# Patient Record
Sex: FEMALE | Race: WHITE | NOT HISPANIC OR LATINO | Employment: UNEMPLOYED | URBAN - NONMETROPOLITAN AREA
[De-identification: names, ages, dates, MRNs, and addresses within clinical notes are randomized per-mention and may not be internally consistent; named-entity substitution may affect disease eponyms.]

---

## 2017-10-01 ENCOUNTER — HOSPITAL ENCOUNTER (EMERGENCY)
Facility: HOSPITAL | Age: 32
Discharge: HOME | End: 2017-10-02
Attending: EMERGENCY MEDICINE | Admitting: EMERGENCY MEDICINE

## 2017-10-01 VITALS
OXYGEN SATURATION: 100 % | SYSTOLIC BLOOD PRESSURE: 134 MMHG | HEART RATE: 73 BPM | RESPIRATION RATE: 18 BRPM | BODY MASS INDEX: 31.32 KG/M2 | TEMPERATURE: 98.3 F | DIASTOLIC BLOOD PRESSURE: 79 MMHG | WEIGHT: 188 LBS | HEIGHT: 65 IN

## 2017-10-01 DIAGNOSIS — R55 SYNCOPE AND COLLAPSE: ICD-10-CM

## 2017-10-01 DIAGNOSIS — G44.52 NEW DAILY PERSISTENT HEADACHE: Primary | ICD-10-CM

## 2017-10-01 LAB
BASOPHILS # BLD AUTO: 0.05 10*3/MM3 (ref 0–0.2)
BASOPHILS NFR BLD AUTO: 0.4 % (ref 0–2)
BILIRUB UR QL STRIP: NEGATIVE
CLARITY UR: CLEAR
COLOR UR: YELLOW
DEPRECATED RDW RBC AUTO: 40.4 FL (ref 36.4–46.3)
EOSINOPHIL # BLD AUTO: 0.09 10*3/MM3 (ref 0–0.7)
EOSINOPHIL NFR BLD AUTO: 0.8 % (ref 0–7)
ERYTHROCYTE [DISTWIDTH] IN BLOOD BY AUTOMATED COUNT: 13.4 % (ref 11.5–14.5)
GLUCOSE UR STRIP-MCNC: NEGATIVE MG/DL
HCT VFR BLD AUTO: 41.3 % (ref 35–45)
HGB BLD-MCNC: 13.7 G/DL (ref 12–15.5)
HGB UR QL STRIP.AUTO: NEGATIVE
IMM GRANULOCYTES # BLD: 0.04 10*3/MM3 (ref 0–0.02)
IMM GRANULOCYTES NFR BLD: 0.3 % (ref 0–0.5)
KETONES UR QL STRIP: NEGATIVE
LEUKOCYTE ESTERASE UR QL STRIP.AUTO: NEGATIVE
LYMPHOCYTES # BLD AUTO: 3.49 10*3/MM3 (ref 0.6–4.2)
LYMPHOCYTES NFR BLD AUTO: 29.9 % (ref 10–50)
MCH RBC QN AUTO: 27.8 PG (ref 26.5–34)
MCHC RBC AUTO-ENTMCNC: 33.2 G/DL (ref 31.4–36)
MCV RBC AUTO: 83.8 FL (ref 80–98)
MONOCYTES # BLD AUTO: 0.85 10*3/MM3 (ref 0–0.9)
MONOCYTES NFR BLD AUTO: 7.3 % (ref 0–12)
NEUTROPHILS # BLD AUTO: 7.14 10*3/MM3 (ref 2–8.6)
NEUTROPHILS NFR BLD AUTO: 61.3 % (ref 37–80)
NITRITE UR QL STRIP: NEGATIVE
PH UR STRIP.AUTO: 7.5 [PH] (ref 5–9)
PLATELET # BLD AUTO: 245 10*3/MM3 (ref 150–450)
PMV BLD AUTO: 9.2 FL (ref 8–12)
PROT UR QL STRIP: NEGATIVE
RBC # BLD AUTO: 4.93 10*6/MM3 (ref 3.77–5.16)
SP GR UR STRIP: 1.01 (ref 1–1.03)
UROBILINOGEN UR QL STRIP: NORMAL
WBC NRBC COR # BLD: 11.66 10*3/MM3 (ref 3.2–9.8)

## 2017-10-01 PROCEDURE — 84703 CHORIONIC GONADOTROPIN ASSAY: CPT | Performed by: EMERGENCY MEDICINE

## 2017-10-01 PROCEDURE — 85025 COMPLETE CBC W/AUTO DIFF WBC: CPT | Performed by: EMERGENCY MEDICINE

## 2017-10-01 PROCEDURE — 96361 HYDRATE IV INFUSION ADD-ON: CPT

## 2017-10-01 PROCEDURE — 93010 ELECTROCARDIOGRAM REPORT: CPT | Performed by: INTERNAL MEDICINE

## 2017-10-01 PROCEDURE — 93005 ELECTROCARDIOGRAM TRACING: CPT | Performed by: EMERGENCY MEDICINE

## 2017-10-01 PROCEDURE — 81003 URINALYSIS AUTO W/O SCOPE: CPT | Performed by: EMERGENCY MEDICINE

## 2017-10-01 PROCEDURE — 25010000002 KETOROLAC TROMETHAMINE PER 15 MG: Performed by: EMERGENCY MEDICINE

## 2017-10-01 PROCEDURE — 99283 EMERGENCY DEPT VISIT LOW MDM: CPT

## 2017-10-01 PROCEDURE — 80048 BASIC METABOLIC PNL TOTAL CA: CPT | Performed by: EMERGENCY MEDICINE

## 2017-10-01 PROCEDURE — 96375 TX/PRO/DX INJ NEW DRUG ADDON: CPT

## 2017-10-01 PROCEDURE — 25010000002 PROMETHAZINE PER 50 MG: Performed by: EMERGENCY MEDICINE

## 2017-10-01 PROCEDURE — 96374 THER/PROPH/DIAG INJ IV PUSH: CPT

## 2017-10-01 RX ORDER — PROMETHAZINE HYDROCHLORIDE 25 MG/ML
12.5 INJECTION, SOLUTION INTRAMUSCULAR; INTRAVENOUS ONCE
Status: COMPLETED | OUTPATIENT
Start: 2017-10-01 | End: 2017-10-01

## 2017-10-01 RX ORDER — SODIUM CHLORIDE 0.9 % (FLUSH) 0.9 %
10 SYRINGE (ML) INJECTION AS NEEDED
Status: DISCONTINUED | OUTPATIENT
Start: 2017-10-01 | End: 2017-10-02 | Stop reason: HOSPADM

## 2017-10-01 RX ORDER — KETOROLAC TROMETHAMINE 30 MG/ML
30 INJECTION, SOLUTION INTRAMUSCULAR; INTRAVENOUS ONCE
Status: COMPLETED | OUTPATIENT
Start: 2017-10-01 | End: 2017-10-01

## 2017-10-01 RX ORDER — NAPROXEN 250 MG/1
250 TABLET ORAL 2 TIMES DAILY PRN
COMMUNITY
End: 2020-05-19

## 2017-10-01 RX ADMIN — SODIUM CHLORIDE 1000 ML: 9 INJECTION, SOLUTION INTRAVENOUS at 23:29

## 2017-10-01 RX ADMIN — PROMETHAZINE HYDROCHLORIDE 12.5 MG: 25 INJECTION INTRAMUSCULAR; INTRAVENOUS at 23:28

## 2017-10-01 RX ADMIN — KETOROLAC TROMETHAMINE 30 MG: 30 INJECTION, SOLUTION INTRAMUSCULAR; INTRAVENOUS at 23:29

## 2017-10-02 ENCOUNTER — APPOINTMENT (OUTPATIENT)
Dept: CT IMAGING | Facility: HOSPITAL | Age: 32
End: 2017-10-02

## 2017-10-02 LAB
ANION GAP SERPL CALCULATED.3IONS-SCNC: 10 MMOL/L (ref 5–15)
BUN BLD-MCNC: 12 MG/DL (ref 7–21)
BUN/CREAT SERPL: 14.6 (ref 7–25)
CALCIUM SPEC-SCNC: 9.4 MG/DL (ref 8.4–10.2)
CHLORIDE SERPL-SCNC: 97 MMOL/L (ref 95–110)
CO2 SERPL-SCNC: 31 MMOL/L (ref 22–31)
CREAT BLD-MCNC: 0.82 MG/DL (ref 0.5–1)
GFR SERPL CREATININE-BSD FRML MDRD: 80 ML/MIN/1.73 (ref 60–149)
GLUCOSE BLD-MCNC: 105 MG/DL (ref 60–100)
HCG SERPL QL: NEGATIVE
HOLD SPECIMEN: NORMAL
POTASSIUM BLD-SCNC: 3.7 MMOL/L (ref 3.5–5.1)
SODIUM BLD-SCNC: 138 MMOL/L (ref 137–145)
WHOLE BLOOD HOLD SPECIMEN: NORMAL

## 2017-10-02 PROCEDURE — 70450 CT HEAD/BRAIN W/O DYE: CPT

## 2017-10-02 RX ORDER — PROMETHAZINE HYDROCHLORIDE 25 MG/1
25 TABLET ORAL EVERY 6 HOURS PRN
Qty: 15 TABLET | Refills: 0 | Status: SHIPPED | OUTPATIENT
Start: 2017-10-02

## 2017-10-02 NOTE — ED TRIAGE NOTES
Patient to ED from home c/o chronic headaches. Patient notes that she was dx with migraines approximately 10 years ago. Patient notes generalized headaches as sharp and stabbing, 6/10 pain. Patient states that she passed out yesterday.

## 2017-10-02 NOTE — DISCHARGE INSTRUCTIONS
Return ED worsened headache, fever, vomiting, chest pain, shortness of air, syncope, palpitations, worse condition, other concerns  *Recommend outpatient MRI brain*

## 2017-10-02 NOTE — ED PROVIDER NOTES
Subjective   HPI Comments: 33yo female pmh significant migraine ha presents ED c/o 1 month hx daily intermittent bilateral parietal occipital 'throbbing' headache/nonradiating/neg exac or relieve factors/assoc nausea, unresponsive to acetaminophen.  Pt reports sustained syncopal episode.  Pt currently rates headache 5-6/10; denies fever/chills/trauma/paresthesia/motor weakness/worst headache of life/hx seizure/incontinence/tongue biting/fam hx aneurysm.    Patient is a 32 y.o. female presenting with general illness.   Illness   Severity:  Moderate  Onset quality:  Gradual  Duration:  1 month  Timing:  Intermittent  Progression:  Waxing and waning  Chronicity:  New  Associated symptoms: headaches, nausea and vomiting        Review of Systems   Constitutional: Negative.    Eyes: Negative.    Respiratory: Negative.    Cardiovascular: Negative.    Gastrointestinal: Positive for nausea and vomiting.   Endocrine: Negative.    Genitourinary: Negative.    Musculoskeletal: Negative.    Neurological: Positive for syncope and headaches. Negative for dizziness, seizures, facial asymmetry, weakness and numbness.       Past Medical History:   Diagnosis Date   • GERD (gastroesophageal reflux disease)    • Migraine        No Known Allergies    Past Surgical History:   Procedure Laterality Date   •  SECTION     • CHOLECYSTECTOMY     • HERNIA REPAIR     • HYSTERECTOMY         History reviewed. No pertinent family history.    Social History     Social History   • Marital status: Single     Spouse name: N/A   • Number of children: N/A   • Years of education: N/A     Social History Main Topics   • Smoking status: Never Smoker   • Smokeless tobacco: Current User     Types: Snuff   • Alcohol use No   • Drug use: None   • Sexual activity: Not Asked     Other Topics Concern   • None     Social History Narrative   • None           Objective   Physical Exam   Constitutional: She is oriented to person, place, and time. She appears  well-developed and well-nourished.   HENT:   Head: Normocephalic and atraumatic.   Right Ear: External ear normal.   Left Ear: External ear normal.   Nose: Nose normal.   Mouth/Throat: Oropharynx is clear and moist.   Eyes: EOM are normal. Pupils are equal, round, and reactive to light.   Neck: Normal range of motion. Neck supple. No JVD present. No tracheal deviation present. No Brudzinski's sign noted.   Cardiovascular: Normal rate, regular rhythm, normal heart sounds and intact distal pulses.  Exam reveals no gallop and no friction rub.    No murmur heard.  Pulmonary/Chest: Effort normal and breath sounds normal. She has no wheezes. She has no rales.   Abdominal: Soft. Bowel sounds are normal. There is no tenderness. There is no rebound and no guarding.   Musculoskeletal: Normal range of motion. She exhibits no edema.   Lymphadenopathy:     She has no cervical adenopathy.   Neurological: She is alert and oriented to person, place, and time. She has normal strength. No cranial nerve deficit or sensory deficit. Coordination normal. GCS eye subscore is 4. GCS verbal subscore is 5. GCS motor subscore is 6.   Skin: Skin is warm and dry.   Nursing note and vitals reviewed.      ECG 12 Lead    Date/Time: 10/2/2017 1:33 AM  Performed by: GREGG CORONA  Authorized by: GREGG CORONA   Rhythm: sinus bradycardia  Rate: bradycardic  BPM: 56  QRS axis: normal  Conduction: conduction normal  ST Segments: ST segments normal  T Waves: T waves normal  Other: no other findings  Clinical impression: normal ECG               ED Course  ED Course      Labs Reviewed   BASIC METABOLIC PANEL - Abnormal; Notable for the following:        Result Value    Glucose 105 (*)     All other components within normal limits   CBC WITH AUTO DIFFERENTIAL - Abnormal; Notable for the following:     WBC 11.66 (*)     Immature Grans, Absolute 0.04 (*)     All other components within normal limits   URINALYSIS W/ CULTURE IF INDICATED - Normal     Narrative:     Urine microscopic not indicated.   HCG, SERUM, QUALITATIVE - Normal   POCT PEFORM URINE PREGNANCY   CBC AND DIFFERENTIAL    Narrative:     The following orders were created for panel order CBC & Differential.  Procedure                               Abnormality         Status                     ---------                               -----------         ------                     CBC Auto Differential[[id number]]        Abnormal            Final result                 Please view results for these tests on the individual orders.   EXTRA TUBES    Narrative:     The following orders were created for panel order Extra Tubes.  Procedure                               Abnormality         Status                     ---------                               -----------         ------                     Light Blue Top[[id number]]                                   Final result               Gold Top - SST[[id number]]                                   Final result                 Please view results for these tests on the individual orders.   LIGHT BLUE TOP   GOLD TOP - SST     Ct Head Without Contrast    Result Date: 10/2/2017  Narrative: CT head without contrast on  10/2/2017 CLINICAL INDICATION:  Headache TECHNIQUE: Multiple axial images are obtained throughout the head without the administration of contrast. This study was performed with techniques to keep radiation doses as low as reasonably achievable, (ALARA). Total DLP is 924.8 mGy*cm. COMPARISON: None FINDINGS:   There is no hydrocephalus. There is no CT evidence of acute infarct. There is no hemorrhage. There are no abnormal extra-axial fluid collections. There is no mass, mass effect or midline shift. No bony abnormality is noted. Cerebellar tonsils appear to be mildly low-lying raising question of a Chiari I malformation although incompletely imaged. Consider follow-up nonemergent MRI of the head to better evaluate.     Impression: Possible mildly  low-lying cerebellar tonsils raising question of a Chiari I malformation although incompletely imaged. Consider follow-up nonemergent MRI of the head. Otherwise no acute intracranial abnormality. Electronically signed by:  Tera Camarena  10/2/2017 1:17 AM CDT Workstation: [id number]                MDM    Final diagnoses:   New daily persistent headache   Syncope and collapse            Sixto Mooney MD  10/02/17 0147       Sixto Mooney MD  10/02/17 0159

## 2019-06-20 ENCOUNTER — OFFICE VISIT (OUTPATIENT)
Dept: ORTHOPEDIC SURGERY | Facility: CLINIC | Age: 34
End: 2019-06-20

## 2019-06-20 VITALS
BODY MASS INDEX: 34.49 KG/M2 | DIASTOLIC BLOOD PRESSURE: 78 MMHG | WEIGHT: 207 LBS | HEIGHT: 65 IN | OXYGEN SATURATION: 90 % | SYSTOLIC BLOOD PRESSURE: 112 MMHG | HEART RATE: 76 BPM

## 2019-06-20 DIAGNOSIS — M79.641 BILATERAL HAND PAIN: Primary | ICD-10-CM

## 2019-06-20 DIAGNOSIS — G56.03 BILATERAL CARPAL TUNNEL SYNDROME: ICD-10-CM

## 2019-06-20 DIAGNOSIS — M79.642 BILATERAL HAND PAIN: Primary | ICD-10-CM

## 2019-06-20 PROCEDURE — 20605 DRAIN/INJ JOINT/BURSA W/O US: CPT | Performed by: ORTHOPAEDIC SURGERY

## 2019-06-20 PROCEDURE — 99202 OFFICE O/P NEW SF 15 MIN: CPT | Performed by: ORTHOPAEDIC SURGERY

## 2019-06-20 RX ORDER — LIDOCAINE HYDROCHLORIDE 10 MG/ML
1 INJECTION, SOLUTION EPIDURAL; INFILTRATION; INTRACAUDAL; PERINEURAL
Status: COMPLETED | OUTPATIENT
Start: 2019-06-20 | End: 2019-06-20

## 2019-06-20 RX ORDER — TRIAMCINOLONE ACETONIDE 40 MG/ML
40 INJECTION, SUSPENSION INTRA-ARTICULAR; INTRAMUSCULAR
Status: COMPLETED | OUTPATIENT
Start: 2019-06-20 | End: 2019-06-20

## 2019-06-20 RX ADMIN — LIDOCAINE HYDROCHLORIDE 1 ML: 10 INJECTION, SOLUTION EPIDURAL; INFILTRATION; INTRACAUDAL; PERINEURAL at 15:02

## 2019-06-20 RX ADMIN — TRIAMCINOLONE ACETONIDE 40 MG: 40 INJECTION, SUSPENSION INTRA-ARTICULAR; INTRAMUSCULAR at 14:58

## 2019-06-20 RX ADMIN — TRIAMCINOLONE ACETONIDE 40 MG: 40 INJECTION, SUSPENSION INTRA-ARTICULAR; INTRAMUSCULAR at 15:02

## 2019-06-20 RX ADMIN — LIDOCAINE HYDROCHLORIDE 1 ML: 10 INJECTION, SOLUTION EPIDURAL; INFILTRATION; INTRACAUDAL; PERINEURAL at 14:58

## 2019-07-23 ENCOUNTER — OFFICE VISIT (OUTPATIENT)
Dept: ORTHOPEDIC SURGERY | Facility: CLINIC | Age: 34
End: 2019-07-23

## 2019-07-23 VITALS
OXYGEN SATURATION: 95 % | HEART RATE: 72 BPM | BODY MASS INDEX: 34.66 KG/M2 | RESPIRATION RATE: 18 BRPM | WEIGHT: 208 LBS | HEIGHT: 65 IN

## 2019-07-23 DIAGNOSIS — M79.642 BILATERAL HAND PAIN: Primary | ICD-10-CM

## 2019-07-23 DIAGNOSIS — M79.641 BILATERAL HAND PAIN: Primary | ICD-10-CM

## 2019-07-23 DIAGNOSIS — G56.03 BILATERAL CARPAL TUNNEL SYNDROME: ICD-10-CM

## 2019-07-23 PROCEDURE — 99212 OFFICE O/P EST SF 10 MIN: CPT | Performed by: ORTHOPAEDIC SURGERY

## 2019-10-11 ENCOUNTER — APPOINTMENT (OUTPATIENT)
Dept: CT IMAGING | Facility: HOSPITAL | Age: 34
End: 2019-10-11

## 2019-10-11 ENCOUNTER — APPOINTMENT (OUTPATIENT)
Dept: GENERAL RADIOLOGY | Facility: HOSPITAL | Age: 34
End: 2019-10-11

## 2019-10-11 ENCOUNTER — HOSPITAL ENCOUNTER (EMERGENCY)
Facility: HOSPITAL | Age: 34
Discharge: HOME | End: 2019-10-11
Attending: EMERGENCY MEDICINE | Admitting: EMERGENCY MEDICINE

## 2019-10-11 VITALS
TEMPERATURE: 98.5 F | RESPIRATION RATE: 16 BRPM | BODY MASS INDEX: 34.66 KG/M2 | HEIGHT: 65 IN | DIASTOLIC BLOOD PRESSURE: 66 MMHG | SYSTOLIC BLOOD PRESSURE: 115 MMHG | HEART RATE: 64 BPM | WEIGHT: 208 LBS | OXYGEN SATURATION: 98 %

## 2019-10-11 DIAGNOSIS — S39.012A STRAIN OF LUMBAR REGION, INITIAL ENCOUNTER: ICD-10-CM
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
## 2019-10-11 LAB
HOLD SPECIMEN: NORMAL
HOLD SPECIMEN: NORMAL
WHOLE BLOOD HOLD SPECIMEN: NORMAL
WHOLE BLOOD HOLD SPECIMEN: NORMAL

## 2019-10-11 PROCEDURE — 99284 EMERGENCY DEPT VISIT MOD MDM: CPT

## 2019-10-11 PROCEDURE — 72125 CT NECK SPINE W/O DYE: CPT

## 2019-10-11 PROCEDURE — 73502 X-RAY EXAM HIP UNI 2-3 VIEWS: CPT

## 2019-10-11 PROCEDURE — 71250 CT THORAX DX C-: CPT

## 2019-10-11 PROCEDURE — 25010000002 ONDANSETRON PER 1 MG: Performed by: NURSE PRACTITIONER

## 2019-10-11 PROCEDURE — 72131 CT LUMBAR SPINE W/O DYE: CPT

## 2019-10-11 PROCEDURE — 96376 TX/PRO/DX INJ SAME DRUG ADON: CPT

## 2019-10-11 PROCEDURE — 96375 TX/PRO/DX INJ NEW DRUG ADDON: CPT

## 2019-10-11 PROCEDURE — 70450 CT HEAD/BRAIN W/O DYE: CPT

## 2019-10-11 PROCEDURE — 25010000002 MORPHINE PER 10 MG: Performed by: NURSE PRACTITIONER

## 2019-10-11 PROCEDURE — 96374 THER/PROPH/DIAG INJ IV PUSH: CPT

## 2019-10-11 PROCEDURE — 74176 CT ABD & PELVIS W/O CONTRAST: CPT

## 2019-10-11 RX ORDER — ONDANSETRON 2 MG/ML
4 INJECTION INTRAMUSCULAR; INTRAVENOUS ONCE
Status: COMPLETED | OUTPATIENT
Start: 2019-10-11 | End: 2019-10-11

## 2019-10-11 RX ORDER — CYCLOBENZAPRINE HCL 10 MG
10 TABLET ORAL 3 TIMES DAILY PRN
Qty: 15 TABLET | Refills: 0 | Status: SHIPPED | OUTPATIENT
Start: 2019-10-11 | End: 2019-10-16

## 2019-10-11 RX ORDER — NABUMETONE 500 MG/1
500 TABLET, FILM COATED ORAL 2 TIMES DAILY
Qty: 14 TABLET | Refills: 0 | Status: SHIPPED | OUTPATIENT
Start: 2019-10-11 | End: 2019-10-18

## 2019-10-11 RX ORDER — SODIUM CHLORIDE 0.9 % (FLUSH) 0.9 %
10 SYRINGE (ML) INJECTION AS NEEDED
Status: DISCONTINUED | OUTPATIENT
Start: 2019-10-11 | End: 2019-10-11 | Stop reason: HOSPADM

## 2019-10-11 RX ADMIN — MORPHINE SULFATE 4 MG: 4 INJECTION INTRAVENOUS at 19:34

## 2019-10-11 RX ADMIN — MORPHINE SULFATE 4 MG: 4 INJECTION, SOLUTION INTRAMUSCULAR; INTRAVENOUS at 15:33

## 2019-10-11 RX ADMIN — ONDANSETRON 4 MG: 2 INJECTION INTRAMUSCULAR; INTRAVENOUS at 15:33
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
## 2020-03-30 ENCOUNTER — TELEPHONE (OUTPATIENT)
Dept: ORTHOPEDIC SURGERY | Facility: CLINIC | Age: 35
End: 2020-03-30

## 2020-03-30 NOTE — TELEPHONE ENCOUNTER
STEPHANIE        Pt CALLED STATING HER HANDS ARE GETTING WORSE GOING NUMB EVEN WHEN WEARING BRACE     Pt IS WANTING TO KNOW IF THERE IS ANYTHING SHE CAN DO IN THE MEAN TIME?      CALL BACK # [phone]      If she wants repeat injections schedule her for followup ( this week OK )

## 2020-04-07 ENCOUNTER — OFFICE VISIT (OUTPATIENT)
Dept: ORTHOPEDIC SURGERY | Facility: CLINIC | Age: 35
End: 2020-04-07

## 2020-04-07 VITALS
BODY MASS INDEX: 35.29 KG/M2 | WEIGHT: 211.8 LBS | OXYGEN SATURATION: 98 % | HEART RATE: 65 BPM | TEMPERATURE: 96.7 F | HEIGHT: 65 IN

## 2020-04-07 DIAGNOSIS — M79.642 BILATERAL HAND PAIN: Primary | ICD-10-CM

## 2020-04-07 DIAGNOSIS — M79.641 BILATERAL HAND PAIN: Primary | ICD-10-CM

## 2020-04-07 DIAGNOSIS — G56.03 BILATERAL CARPAL TUNNEL SYNDROME: ICD-10-CM

## 2020-04-07 PROCEDURE — 20526 THER INJECTION CARP TUNNEL: CPT | Performed by: ORTHOPAEDIC SURGERY

## 2020-04-07 PROCEDURE — 99213 OFFICE O/P EST LOW 20 MIN: CPT | Performed by: ORTHOPAEDIC SURGERY

## 2020-04-07 RX ORDER — TRIAMCINOLONE ACETONIDE 40 MG/ML
40 INJECTION, SUSPENSION INTRA-ARTICULAR; INTRAMUSCULAR
Status: COMPLETED | OUTPATIENT
Start: 2020-04-07 | End: 2020-04-07

## 2020-04-07 RX ORDER — LIDOCAINE HYDROCHLORIDE 10 MG/ML
1 INJECTION, SOLUTION EPIDURAL; INFILTRATION; INTRACAUDAL; PERINEURAL
Status: COMPLETED | OUTPATIENT
Start: 2020-04-07 | End: 2020-04-07

## 2020-04-07 RX ADMIN — LIDOCAINE HYDROCHLORIDE 1 ML: 10 INJECTION, SOLUTION EPIDURAL; INFILTRATION; INTRACAUDAL; PERINEURAL at 09:40

## 2020-04-07 RX ADMIN — TRIAMCINOLONE ACETONIDE 40 MG: 40 INJECTION, SUSPENSION INTRA-ARTICULAR; INTRAMUSCULAR at 09:40

## 2020-04-07 RX ADMIN — LIDOCAINE HYDROCHLORIDE 1 ML: 10 INJECTION, SOLUTION EPIDURAL; INFILTRATION; INTRACAUDAL; PERINEURAL at 09:41

## 2020-04-07 RX ADMIN — TRIAMCINOLONE ACETONIDE 40 MG: 40 INJECTION, SUSPENSION INTRA-ARTICULAR; INTRAMUSCULAR at 09:41

## 2020-04-07 NOTE — PROGRESS NOTES
"Matilde Lopez is a 35 y.o. female returns for     Chief Complaint   Patient presents with   • Left Hand - Follow-up   • Right Hand - Follow-up       HISTORY OF PRESENT ILLNESS: Patient being seen for bilateral hand pain.     Mrs. Gates has had problems with her hands for 7 years or more.  Her last injections he had to wear off about 3 months ago.  She describes having to frequently shake her hands to relieve her symptoms.       CONCURRENT MEDICAL HISTORY:    The following portions of the patient's history were reviewed and updated as appropriate: allergies, current medications, past family history, past medical history, past social history, past surgical history and problem list.         PHYSICAL EXAMINATION:       Pulse 65   Temp 96.7 °F (35.9 °C)   Ht 165.1 cm (65\")   Wt 96.1 kg (211 lb 12.8 oz)   LMP  (LMP Unknown)   SpO2 98%   BMI 35.25 kg/m²     Physical Exam she is alert and in no apparent distress.    GAIT:     [x]  Normal  []  Antalgic    Assistive device: [x]  None  []  Walker     []  Crutches  []  Cane     []  Wheelchair  []  Stretcher    Ortho Exam digital motions are full.  There is a suggestion of mild synovitis of the volar aspect of the distal forearm bilaterally.  There is a longitudinal scar over the ulnar aspect of the volar forearm on the left.      No results found.          ASSESSMENT: Bilateral carpal tunnel syndrome.  She understands carpal tunnel release will likely eventually be needed.  She was given the [employer] website to further educate herself.    Potential benefits of repeat injection were discussed.  She wished to proceed with this.    Each wrist was prepped.  Skin was infiltrated with 1% Xylocaine with epinephrine.  The flexor compartment of each distal forearm was then injected with a mixture of Xylocaine and Kenalog.  There were no complications.    When her symptoms recur she will call for reevaluation.    Diagnoses and all orders for this visit:    Bilateral hand pain  - "     Medium Joint Arthrocentesis: R radiocarpal  -     Medium Joint Arthrocentesis: L radiocarpal    Bilateral carpal tunnel syndrome  -     Medium Joint Arthrocentesis: R radiocarpal  -     Medium Joint Arthrocentesis: L radiocarpal    Other orders  -     Cancel: Small Joint Arthrocentesis      Medium Joint Arthrocentesis: R radiocarpal  Date/Time: 4/7/2020 9:40 AM  Consent given by: patient  Site marked: site marked  Timeout: Immediately prior to procedure a time out was called to verify the correct patient, procedure, equipment, support staff and site/side marked as required   Supporting Documentation  Indications: pain   Procedure Details  Location: wrist - R radiocarpal  Preparation: Patient was prepped and draped in the usual sterile fashion  Needle size: 25 G  Approach: anterolateral  Medications administered: 1 mL lidocaine PF 1% 1 %; 40 mg triamcinolone acetonide 40 MG/ML  Patient tolerance: patient tolerated the procedure well with no immediate complications    Medium Joint Arthrocentesis: L radiocarpal  Date/Time: 4/7/2020 9:41 AM  Consent given by: patient  Site marked: site marked  Timeout: Immediately prior to procedure a time out was called to verify the correct patient, procedure, equipment, support staff and site/side marked as required   Supporting Documentation  Indications: pain   Procedure Details  Location: wrist - L radiocarpal  Preparation: Patient was prepped and draped in the usual sterile fashion  Needle size: 25 G  Approach: anterolateral  Medications administered: 1 mL lidocaine PF 1% 1 %; 40 mg triamcinolone acetonide 40 MG/ML  Patient tolerance: patient tolerated the procedure well with no immediate complications            PLAN    Patient's Body mass index is 35.25 kg/m². BMI is above normal parameters. Recommendations include: exercise counseling and nutrition counseling].      Return if symptoms worsen or fail to improve.    Zaid High MD

## 2020-04-08 ENCOUNTER — TELEPHONE (OUTPATIENT)
Dept: ORTHOPEDIC SURGERY | Facility: CLINIC | Age: 35
End: 2020-04-08

## 2020-04-08 NOTE — TELEPHONE ENCOUNTER
Patient advised to use ice and motrin,  Tylenol. Keep moving her fingers and call if no improvement she can be seen by Dr. High.    I will see her Friday if she has not improved.

## 2020-04-08 NOTE — TELEPHONE ENCOUNTER
Lennox Rodarte left wrist became swollen after her injections yesterday. She stated last night it began to become stiff. This morning she is unable to move at all, she would like to know what to do from here because she is in a lot of pain. Please contact at [phone].

## 2020-05-19 ENCOUNTER — OFFICE VISIT (OUTPATIENT)
Dept: ORTHOPEDIC SURGERY | Facility: CLINIC | Age: 35
End: 2020-05-19

## 2020-05-19 VITALS
HEART RATE: 74 BPM | DIASTOLIC BLOOD PRESSURE: 67 MMHG | HEIGHT: 65 IN | OXYGEN SATURATION: 97 % | WEIGHT: 212.5 LBS | SYSTOLIC BLOOD PRESSURE: 122 MMHG | TEMPERATURE: 97.4 F | BODY MASS INDEX: 35.4 KG/M2

## 2020-05-19 DIAGNOSIS — M25.532 LEFT WRIST PAIN: Primary | ICD-10-CM

## 2020-05-19 DIAGNOSIS — S63.502A WRIST SPRAIN, LEFT, INITIAL ENCOUNTER: ICD-10-CM

## 2020-05-19 PROCEDURE — 99214 OFFICE O/P EST MOD 30 MIN: CPT | Performed by: ORTHOPAEDIC SURGERY

## 2020-05-19 RX ORDER — NORTRIPTYLINE HYDROCHLORIDE 10 MG/1
CAPSULE ORAL
COMMUNITY
End: 2020-11-10

## 2020-05-19 RX ORDER — POLYETHYLENE GLYCOL 3350 17 G/17G
17 POWDER, FOR SOLUTION ORAL DAILY
COMMUNITY
End: 2021-07-12

## 2020-05-19 RX ORDER — IBUPROFEN 800 MG/1
800 TABLET ORAL EVERY 8 HOURS PRN
COMMUNITY

## 2020-05-19 NOTE — PROGRESS NOTES
Matilde Lopez is a 35 y.o. female   Primary provider:  Mirian Cano APRN       Chief Complaint   Patient presents with   • Left Wrist - Pain   • Establish Care       HISTORY OF PRESENT ILLNESS: Patient being seen for left wrist pain due to fall occurring 5/17/2020. No recent x-rays.     This is the first office visit for evaluation of left wrist pain.    Mrs. Gates is now 35 years old.  She said she slipped and fell 2 days ago with an extension injury to the left wrist.  She denies any previous injury to the wrist.  The pain is fairly diffuse both volar radial and dorsal worse with use of the hand.  Treatment has included activity restriction.  Her pain is been relieved by ice.  She had a good response to her carpal tunnel injections recently.    Past medical history is unchanged from previous notes.    Pain   This is a new problem. The current episode started in the past 7 days. The problem occurs intermittently. Associated symptoms comments: Aching, burning, swelling.        CONCURRENT MEDICAL HISTORY:    Past Medical History:   Diagnosis Date   • Anemia    • Anxiety    • Asthma    • Diabetes (CMS/HCC)    • GERD (gastroesophageal reflux disease)    • History of stomach ulcers    • Kidney disease    • Migraine        Allergies   Allergen Reactions   • Bee Venom Anaphylaxis   • Iodinated Diagnostic Agents Unknown (See Comments)     unknown         Current Outpatient Medications:   •  EPINEPHrine (EPIPEN IJ), Inject  as directed., Disp: , Rfl:   •  Ferrous Sulfate (IRON PO), Take  by mouth., Disp: , Rfl:   •  ibuprofen (ADVIL,MOTRIN) 800 MG tablet, Take 800 mg by mouth Every 6 (Six) Hours As Needed for Mild Pain ., Disp: , Rfl:   •  Multiple Vitamin (MULTIVITAMIN PO), Take  by mouth., Disp: , Rfl:   •  nortriptyline (Pamelor) 10 MG capsule, Take  by mouth., Disp: , Rfl:   •  polyethylene glycol (MIRALAX) packet, Take 17 g by mouth Daily., Disp: , Rfl:   •  promethazine (PHENERGAN) 25 MG tablet, Take 1 tablet  "by mouth Every 6 (Six) Hours As Needed for Nausea or Vomiting., Disp: 15 tablet, Rfl: 0  •  VITAMIN D PO, Take  by mouth., Disp: , Rfl:     Past Surgical History:   Procedure Laterality Date   •  SECTION     •  SECTION     • CHOLECYSTECTOMY     • GALLBLADDER SURGERY     • HERNIA REPAIR     • HYSTERECTOMY     • SUBTOTAL HYSTERECTOMY         Family History   Problem Relation Age of Onset   • Heart disease Other    • Hypertension Other    • Diabetes Other         Social History     Socioeconomic History   • Marital status:      Spouse name: Not on file   • Number of children: Not on file   • Years of education: Not on file   • Highest education level: Not on file   Tobacco Use   • Smoking status: Never Smoker   • Smokeless tobacco: Never Used   Substance and Sexual Activity   • Alcohol use: No     Frequency: Never   • Drug use: No   Social History Narrative    ** Merged History Encounter **             Review of Systems   Constitutional: Negative.    HENT: Negative.    Eyes: Negative.    Respiratory: Negative.    Cardiovascular: Negative.    Gastrointestinal: Negative.    Endocrine: Negative.    Genitourinary: Negative.    Musculoskeletal: Negative.    Skin: Negative.    Allergic/Immunologic: Negative.    Neurological: Negative.    Hematological: Negative.    Psychiatric/Behavioral: Negative.    Review of systems is positive as noted above.    PHYSICAL EXAMINATION:       Vitals:    20 0910   BP: 122/67   BP Location: Right arm   Patient Position: Sitting   Pulse: 74   Temp: 97.4 °F (36.3 °C)   SpO2: 97%   Weight: 96.4 kg (212 lb 8 oz)   Height: 165.1 cm (65\")   PainSc:   8       Physical Exam she is alert and in no apparent distress.  She responds appropriately to questions and commands.    GAIT:     [x]  Normal  []  Antalgic    Assistive device: [x]  None  []  Walker     []  Crutches  []  Cane     []  Wheelchair  []  Stretcher    Ortho Exam is directed to the left upper extremity.  Forearm " rotation is full smooth and produces apparent mild pain.  Active flexion of the fingers is limited by pain and will bring her fingertips to about 1/2 cm from the distal palmar flexion crease.  Flexion and extension of the wrist are smooth and full but produced mild complaints of pain.  There is tenderness diffusely over the dorsum of the carpus as well as over the radial aspect of the wrist with no palpable abnormality.  There was no ecchymosis nor any significant swelling.  Radial pulses strong.  Sensory exam is intact to soft touch.      Xr Wrist 3+ View Left    Result Date: 5/19/2020  Narrative: Ordering Provider:  Zaid High MD Ordering Diagnosis/Indication:  Left wrist pain Procedure:  XR WRIST 3+ VW LEFT Exam Date:  5/19/20 COMPARISON: None     Impression:  Radiographs of the left wrist PA lateral and oblique views done today show no fracture or significant degenerative change.  There is a radiolucent lesion in the distal pole of the navicular measuring about 4 mm in greatest dimension.  There is no periostitis. Zaid High MD 5/19/20          ASSESSMENT: Extension sprain left wrist.    The natural history of the disorder and treatment options were reviewed.  The prognosis is excellent for full recovery.    She was given an Ace wrap for soft support.  She was also given a wrist brace which she can use as needed for comfort.  She should fairly rapidly wean herself from her brace.  She may advance activities as tolerated.    No routine follow-up is needed.    Diagnoses and all orders for this visit:    Left wrist pain  -     XR Wrist 3+ View Left; Future    Wrist sprain, left, initial encounter    Other orders  -     ibuprofen (ADVIL,MOTRIN) 800 MG tablet; Take 800 mg by mouth Every 6 (Six) Hours As Needed for Mild Pain .  -     EPINEPHrine (EPIPEN IJ); Inject  as directed.  -     VITAMIN D PO; Take  by mouth.  -     Ferrous Sulfate (IRON PO); Take  by mouth.  -     Multiple Vitamin  (MULTIVITAMIN PO); Take  by mouth.  -     nortriptyline (Pamelor) 10 MG capsule; Take  by mouth.  -     polyethylene glycol (MIRALAX) packet; Take 17 g by mouth Daily.          PLAN    Patient's Body mass index is 35.36 kg/m². BMI is above normal parameters. Recommendations include: referral to primary care.      Return if symptoms worsen or fail to improve.    Zaid High MD

## 2020-11-05 ENCOUNTER — TRANSCRIBE ORDERS (OUTPATIENT)
Dept: PODIATRY | Facility: CLINIC | Age: 35
End: 2020-11-05

## 2020-11-05 DIAGNOSIS — L60.0 INGROWN TOENAIL: Primary | ICD-10-CM

## 2020-11-10 ENCOUNTER — OFFICE VISIT (OUTPATIENT)
Dept: PODIATRY | Facility: CLINIC | Age: 35
End: 2020-11-10

## 2020-11-10 VITALS — HEART RATE: 74 BPM | BODY MASS INDEX: 34.32 KG/M2 | HEIGHT: 65 IN | WEIGHT: 206 LBS | OXYGEN SATURATION: 99 %

## 2020-11-10 DIAGNOSIS — M72.2 PLANTAR FASCIITIS: ICD-10-CM

## 2020-11-10 DIAGNOSIS — M79.672 FOOT PAIN, BILATERAL: ICD-10-CM

## 2020-11-10 DIAGNOSIS — G89.29 CHRONIC PAIN OF TOE OF RIGHT FOOT: ICD-10-CM

## 2020-11-10 DIAGNOSIS — M79.674 CHRONIC PAIN OF TOE OF RIGHT FOOT: ICD-10-CM

## 2020-11-10 DIAGNOSIS — M79.671 FOOT PAIN, BILATERAL: ICD-10-CM

## 2020-11-10 DIAGNOSIS — M79.675 CHRONIC PAIN OF TOE OF LEFT FOOT: Primary | ICD-10-CM

## 2020-11-10 DIAGNOSIS — L60.0 INGROWN TOENAIL: ICD-10-CM

## 2020-11-10 DIAGNOSIS — G89.29 CHRONIC PAIN OF TOE OF LEFT FOOT: Primary | ICD-10-CM

## 2020-11-10 PROCEDURE — 99204 OFFICE O/P NEW MOD 45 MIN: CPT | Performed by: PODIATRIST

## 2020-11-10 PROCEDURE — 20550 NJX 1 TENDON SHEATH/LIGAMENT: CPT | Performed by: PODIATRIST

## 2020-11-10 PROCEDURE — 11750 EXCISION NAIL&NAIL MATRIX: CPT | Performed by: PODIATRIST

## 2020-11-10 RX ORDER — DEXLANSOPRAZOLE 60 MG/1
60 CAPSULE, DELAYED RELEASE ORAL DAILY
COMMUNITY
Start: 2020-10-22 | End: 2020-11-12

## 2020-11-10 RX ORDER — AMITRIPTYLINE HYDROCHLORIDE 10 MG/1
10 TABLET, FILM COATED ORAL DAILY
COMMUNITY
Start: 2020-09-14 | End: 2021-03-14

## 2020-11-10 NOTE — PROGRESS NOTES
"Matilde Lopez  1985  35 y.o. female   PCP: 10/30/2020  BS: 248 per patient     Patient presents to clinic today with the compliant of bilateral heel pain and bilateral hallux ingrown nails.     11/10/2020    Chief Complaint   Patient presents with   • Right Foot - Ingrown Toenail, Pain   • Left Foot - Ingrown Toenail, Pain       History of Present Illness    Matilde Loepz is a 35 y.o.female who presents to clinic today with 2 separate pedal complaints.  She complains of chronic recurring ingrowing toenails to both of her great toes.  States that this is a chronic problem for her.  It causes her significant pain.  Pain is aggravated with closed toed shoes and pressure.  She also complains of right and left heel pain.  She describes the pain as sharp and worse with the first up in the morning.  Pain is been present for several months and is gradually getting worse.  She has done nothing to treat it.  There are no associated injuries.  Overall she rates her pain as a 8 out of 10.  Denies any other complaints today.    Past Medical History:   Diagnosis Date   • Anemia    • Anxiety    • Asthma    • Diabetes (CMS/HCC)    • GERD (gastroesophageal reflux disease)    • Gout    • History of stomach ulcers    • Ingrown toenail    • Kidney disease    • Migraine    • Plantar fasciitis          Past Surgical History:   Procedure Laterality Date   •  SECTION     •  SECTION     • CHOLECYSTECTOMY     • GALLBLADDER SURGERY     • HERNIA REPAIR     • HYSTERECTOMY     • SUBTOTAL HYSTERECTOMY           Family History   Problem Relation Age of Onset   • Heart disease Other    • Hypertension Other    • Diabetes Other    • Rashes / Skin problems Mother    • Heart disease Father    • Diabetes Father    • Hypertension Father        Allergies   Allergen Reactions   • Bee Venom Anaphylaxis   • Imitrex [Sumatriptan] Anaphylaxis     \" Throat swells and rash on neck \"   • Iodinated Diagnostic Agents Unknown (See " "Comments)     unknown       Social History     Socioeconomic History   • Marital status:      Spouse name: Not on file   • Number of children: Not on file   • Years of education: Not on file   • Highest education level: Not on file   Tobacco Use   • Smoking status: Never Smoker   • Smokeless tobacco: Never Used   Substance and Sexual Activity   • Alcohol use: No     Frequency: Never   • Drug use: No   Social History Narrative    ** Merged History Encounter **              Current Outpatient Medications   Medication Sig Dispense Refill   • amitriptyline (ELAVIL) 10 MG tablet Take 10 mg by mouth Daily.     • dexlansoprazole (DEXILANT) 60 MG capsule Take 60 mg by mouth Daily.     • EPINEPHrine (EPIPEN IJ) Inject  as directed.     • ibuprofen (ADVIL,MOTRIN) 800 MG tablet Take 800 mg by mouth Every 6 (Six) Hours As Needed for Mild Pain .     • metFORMIN (GLUCOPHAGE) 500 MG tablet Take 500 mg by mouth Daily.     • promethazine (PHENERGAN) 25 MG tablet Take 1 tablet by mouth Every 6 (Six) Hours As Needed for Nausea or Vomiting. 15 tablet 0   • Ferrous Sulfate (IRON PO) Take  by mouth.     • Multiple Vitamin (MULTIVITAMIN PO) Take  by mouth.     • polyethylene glycol (MIRALAX) packet Take 17 g by mouth Daily.     • VITAMIN D PO Take  by mouth.       No current facility-administered medications for this visit.        Review of Systems   Constitutional: Negative.    HENT: Positive for nosebleeds.    Eyes: Negative.    Respiratory: Negative.    Cardiovascular: Negative.    Gastrointestinal: Positive for constipation and diarrhea.   Endocrine: Negative.    Genitourinary: Negative.    Musculoskeletal:        Heel pain  Great toe pain   Skin: Negative.    Allergic/Immunologic: Negative.    Neurological: Negative.    Hematological: Negative.    Psychiatric/Behavioral: Negative.          OBJECTIVE    Pulse 74   Ht 165.1 cm (65\")   Wt 93.4 kg (206 lb)   LMP  (LMP Unknown)   SpO2 99%   BMI 34.28 kg/m²       Physical " Exam  Vitals signs reviewed.   Constitutional:       General: She is not in acute distress.     Appearance: She is well-developed.   HENT:      Head: Normocephalic and atraumatic.      Nose: Nose normal.   Eyes:      Conjunctiva/sclera: Conjunctivae normal.      Pupils: Pupils are equal, round, and reactive to light.   Cardiovascular:      Rate and Rhythm: Normal rate.      Pulses: Normal pulses.           Dorsalis pedis pulses are 2+ on the right side and 2+ on the left side.        Posterior tibial pulses are 2+ on the right side and 2+ on the left side.   Pulmonary:      Effort: Pulmonary effort is normal. No respiratory distress.      Breath sounds: No wheezing.   Musculoskeletal: Normal range of motion.         General: Tenderness present. No deformity.      Right foot: Normal range of motion. No deformity.      Left foot: Normal range of motion. No deformity.        Feet:    Feet:      Right foot:      Protective Sensation: 5 sites tested. 5 sites sensed.      Skin integrity: Skin integrity normal.      Toenail Condition: Right toenails are ingrown.      Left foot:      Protective Sensation: 5 sites tested. 5 sites sensed.      Skin integrity: Skin integrity normal.      Toenail Condition: Left toenails are ingrown.   Skin:     General: Skin is warm and dry.      Capillary Refill: Capillary refill takes less than 2 seconds.   Neurological:      Mental Status: She is alert and oriented to person, place, and time.   Psychiatric:         Behavior: Behavior normal.         Thought Content: Thought content normal.         Gait: Normal    Assistive Device: None      Nail Removal    Date/Time: 11/10/2020 3:08 PM  Performed by: Floyd Tuttle DPM  Authorized by: Floyd Tuttle DPM   Consent: Verbal consent obtained. Written consent obtained.  Risks and benefits: risks, benefits and alternatives were discussed  Consent given by: patient  Patient identity confirmed: verbally with patient  Location: right  foot  Location details: right big toe  Anesthesia: digital block    Sedation:  Patient sedated: no    Preparation: skin prepped with Betadine  Amount removed: partial  Side: medial  Nail matrix removed: partial  Dressing: antibiotic ointment and dressing applied  Patient tolerance: patient tolerated the procedure well with no immediate complications    Nail Removal    Date/Time: 11/10/2020 3:08 PM  Performed by: Darlyn Morales DPM  Authorized by: Darlyn Morales DPM   Consent: Verbal consent obtained. Written consent obtained.  Risks and benefits: risks, benefits and alternatives were discussed  Consent given by: patient  Patient identity confirmed: verbally with patient  Location: left foot  Location details: left big toe  Anesthesia: digital block    Anesthesia:  Local Anesthetic: lidocaine 2% without epinephrine    Sedation:  Patient sedated: no    Preparation: skin prepped with Betadine  Amount removed: partial  Side: medial  Nail matrix removed: partial  Dressing: antibiotic ointment and dressing applied  Patient tolerance: patient tolerated the procedure well with no immediate complications          Plantar Fasciits Injection  Date/Time: 11/10/2020  Performed by: DARLYN MORALES  Authorized by: DARLYN MORALES   Consent: Verbal consent obtained. Written consent obtained.  Risks and benefits: risks, benefits and alternatives were discussed  Consent given by: patient  Patient identity confirmed: verbally with patient  Indications: pain relief  Nerve block body site: right and left heel.  Sedation:  Patient sedated: no    Patient position: sitting  Needle size: 27 G  Local anesthetic: 0.5% Marcaine plain, Kenalog 40 mg/ml , Decadron 4 mg/mL.   Outcome: pain improved  Patient tolerance: Patient tolerated the procedure well with no immediate complications     ASSESSMENT AND PLAN    Diagnoses and all orders for this visit:    1. Chronic pain of toe of left foot (Primary)    2. Chronic pain of toe of right  foot    3. Ingrown toenail    4. Foot pain, bilateral    5. Plantar fasciitis    Other orders  -     Nail Removal  -     Nail Removal        - Comprehensive foot and ankle exam performed  - Steroid injection bilateral heels  - Patient advised to stretch, ice and to make appropriate shoe gear changes to include wearing athletic type shoes with supportive insoles. Patient was given written instructions on how to correctly perform the stretching of the Achilles tendon/calf stretches, and the heel spur/plantar fasciitis regimen. Limit bare foot walking.    - Recommended over-the-counter insole such as power steps, spenco or walk fit  to properly support the arch in order to alleviate the tension and stress on the plantar fascia associated with normal daily walking. Patient was educated on the break-in period for new arch supports.  -Partial permanent right and left hallux nail removal.  - All questions were answered to the patient's satisfaction.  - RTC in 2 weeks            This document has been electronically signed by Floyd Tuttle DPM on November 10, 2020 18:09 CST     11/10/2020  18:09 CST

## 2020-11-11 RX ORDER — BUPIVACAINE HYDROCHLORIDE 5 MG/ML
5 INJECTION, SOLUTION PERINEURAL ONCE
Status: COMPLETED | OUTPATIENT
Start: 2020-11-11 | End: 2020-11-11

## 2020-11-11 RX ORDER — DEXAMETHASONE SODIUM PHOSPHATE 4 MG/ML
2 INJECTION, SOLUTION INTRA-ARTICULAR; INTRALESIONAL; INTRAMUSCULAR; INTRAVENOUS; SOFT TISSUE ONCE
Status: COMPLETED | OUTPATIENT
Start: 2020-11-11 | End: 2020-11-11

## 2020-11-11 RX ORDER — BETAMETHASONE SODIUM PHOSPHATE AND BETAMETHASONE ACETATE 3; 3 MG/ML; MG/ML
6 INJECTION, SUSPENSION INTRA-ARTICULAR; INTRALESIONAL; INTRAMUSCULAR; SOFT TISSUE ONCE
Status: COMPLETED | OUTPATIENT
Start: 2020-11-11 | End: 2020-11-11

## 2020-11-11 RX ORDER — TRIAMCINOLONE ACETONIDE 40 MG/ML
10 INJECTION, SUSPENSION INTRA-ARTICULAR; INTRAMUSCULAR ONCE
Status: COMPLETED | OUTPATIENT
Start: 2020-11-11 | End: 2020-11-11

## 2020-11-11 RX ADMIN — DEXAMETHASONE SODIUM PHOSPHATE 2 MG: 4 INJECTION, SOLUTION INTRA-ARTICULAR; INTRALESIONAL; INTRAMUSCULAR; INTRAVENOUS; SOFT TISSUE at 09:14

## 2020-11-11 RX ADMIN — TRIAMCINOLONE ACETONIDE 10 MG: 40 INJECTION, SUSPENSION INTRA-ARTICULAR; INTRAMUSCULAR at 09:13

## 2020-11-11 RX ADMIN — DEXAMETHASONE SODIUM PHOSPHATE 2 MG: 4 INJECTION, SOLUTION INTRA-ARTICULAR; INTRALESIONAL; INTRAMUSCULAR; INTRAVENOUS; SOFT TISSUE at 09:13

## 2020-11-11 RX ADMIN — BUPIVACAINE HYDROCHLORIDE 1 ML: 5 INJECTION, SOLUTION PERINEURAL at 09:15

## 2020-11-11 RX ADMIN — BETAMETHASONE SODIUM PHOSPHATE AND BETAMETHASONE ACETATE 6 MG: 3; 3 INJECTION, SUSPENSION INTRA-ARTICULAR; INTRALESIONAL; INTRAMUSCULAR; SOFT TISSUE at 09:16

## 2020-11-13 ENCOUNTER — TELEPHONE (OUTPATIENT)
Dept: PODIATRY | Facility: CLINIC | Age: 35
End: 2020-11-13

## 2020-11-24 ENCOUNTER — OFFICE VISIT (OUTPATIENT)
Dept: PODIATRY | Facility: CLINIC | Age: 35
End: 2020-11-24

## 2020-11-24 VITALS — BODY MASS INDEX: 34.32 KG/M2 | OXYGEN SATURATION: 100 % | HEIGHT: 65 IN | HEART RATE: 71 BPM | WEIGHT: 206 LBS

## 2020-11-24 DIAGNOSIS — L60.0 INGROWN TOENAIL: Primary | ICD-10-CM

## 2020-11-24 PROCEDURE — 99212 OFFICE O/P EST SF 10 MIN: CPT | Performed by: PODIATRIST

## 2020-11-24 NOTE — PROGRESS NOTES
"Matilde Lopez  1985  35 y.o. female   CARINE Cano - 10/30/2020  BS - 132 per patient     Patient presents today for a recheck of her bilateral great toenail avulsions.    2020     Chief Complaint   Patient presents with   • Left Foot - Follow-up   • Right Foot - Follow-up       History of Present Illness    Matilde Lopez is a 35 y.o.female who presents to clinic today for follow-up of her toenail procedure.  Denies pain today.    Past Medical History:   Diagnosis Date   • Anemia    • Anxiety    • Asthma    • Diabetes (CMS/HCC)    • GERD (gastroesophageal reflux disease)    • Gout    • History of stomach ulcers    • Ingrown toenail    • Kidney disease    • Migraine    • Plantar fasciitis          Past Surgical History:   Procedure Laterality Date   •  SECTION     •  SECTION     • CHOLECYSTECTOMY     • GALLBLADDER SURGERY     • HERNIA REPAIR     • HYSTERECTOMY     • SUBTOTAL HYSTERECTOMY           Family History   Problem Relation Age of Onset   • Heart disease Other    • Hypertension Other    • Diabetes Other    • Rashes / Skin problems Mother    • Heart disease Father    • Diabetes Father    • Hypertension Father        Allergies   Allergen Reactions   • Bee Venom Anaphylaxis   • Imitrex [Sumatriptan] Anaphylaxis     \" Throat swells and rash on neck \"   • Iodinated Diagnostic Agents Unknown (See Comments)     unknown       Social History     Socioeconomic History   • Marital status:      Spouse name: Not on file   • Number of children: Not on file   • Years of education: Not on file   • Highest education level: Not on file   Tobacco Use   • Smoking status: Never Smoker   • Smokeless tobacco: Never Used   Substance and Sexual Activity   • Alcohol use: No     Frequency: Never   • Drug use: No   • Sexual activity: Defer   Social History Narrative    ** Merged History Encounter **              Current Outpatient Medications   Medication Sig Dispense Refill   • amitriptyline (ELAVIL) " "10 MG tablet Take 10 mg by mouth Daily.     • EPINEPHrine (EPIPEN IJ) Inject  as directed.     • Ferrous Sulfate (IRON PO) Take  by mouth.     • ibuprofen (ADVIL,MOTRIN) 800 MG tablet Take 800 mg by mouth Every 6 (Six) Hours As Needed for Mild Pain .     • metFORMIN (GLUCOPHAGE) 500 MG tablet Take 500 mg by mouth Daily.     • Multiple Vitamin (MULTIVITAMIN PO) Take  by mouth.     • polyethylene glycol (MIRALAX) packet Take 17 g by mouth Daily.     • promethazine (PHENERGAN) 25 MG tablet Take 1 tablet by mouth Every 6 (Six) Hours As Needed for Nausea or Vomiting. 15 tablet 0   • VITAMIN D PO Take  by mouth.       No current facility-administered medications for this visit.        Review of Systems   Constitutional: Negative.    Eyes: Negative.    Respiratory: Negative.    Cardiovascular: Negative.    Skin: Negative.    Neurological: Negative.    Psychiatric/Behavioral: Negative.          OBJECTIVE    Pulse 71   Ht 165.1 cm (65\")   Wt 93.4 kg (206 lb)   LMP  (LMP Unknown)   SpO2 100%   BMI 34.28 kg/m²       Physical Exam  Vitals signs reviewed.   Constitutional:       General: She is not in acute distress.     Appearance: She is well-developed.   HENT:      Head: Normocephalic and atraumatic.      Nose: Nose normal.   Eyes:      Conjunctiva/sclera: Conjunctivae normal.      Pupils: Pupils are equal, round, and reactive to light.   Cardiovascular:      Rate and Rhythm: Normal rate.      Pulses: Normal pulses.           Dorsalis pedis pulses are 2+ on the right side and 2+ on the left side.        Posterior tibial pulses are 2+ on the right side and 2+ on the left side.   Pulmonary:      Effort: Pulmonary effort is normal. No respiratory distress.      Breath sounds: No wheezing.   Musculoskeletal: Normal range of motion.         General: No deformity.      Right foot: Normal range of motion. No deformity.      Left foot: Normal range of motion. No deformity.        Feet:    Feet:      Right foot:      Protective " Sensation: 5 sites tested. 5 sites sensed.      Skin integrity: Skin integrity normal.      Left foot:      Protective Sensation: 5 sites tested. 5 sites sensed.      Skin integrity: Skin integrity normal.   Skin:     General: Skin is warm and dry.      Capillary Refill: Capillary refill takes less than 2 seconds.   Neurological:      Mental Status: She is alert and oriented to person, place, and time.   Psychiatric:         Behavior: Behavior normal.         Thought Content: Thought content normal.         Gait: Normal    Assistive Device: None      Procedures        ASSESSMENT AND PLAN    Diagnoses and all orders for this visit:    1. Ingrown toenail (Primary)        - Patient doing well following nail procedure.  Advised on site care going forward.  All of her questions were answered.  Recheck as needed.             This document has been electronically signed by Floyd Tuttle DPM on November 24, 2020 15:17 CST     11/24/2020  15:17 CST

## 2021-02-17 ENCOUNTER — TELEPHONE (OUTPATIENT)
Dept: PODIATRY | Facility: CLINIC | Age: 36
End: 2021-02-17

## 2021-02-17 NOTE — TELEPHONE ENCOUNTER
PT REQUESTS A CALL BACK RE WANTS TO KNOW IF SHE CAN GET MORE SHOTS IN HER HEEL YET.  CALL BACK #[phone].  THANK YOU.

## 2021-03-01 ENCOUNTER — OFFICE VISIT (OUTPATIENT)
Dept: PODIATRY | Facility: CLINIC | Age: 36
End: 2021-03-01

## 2021-03-01 VITALS — OXYGEN SATURATION: 99 % | HEIGHT: 65 IN | WEIGHT: 206 LBS | HEART RATE: 73 BPM | BODY MASS INDEX: 34.32 KG/M2

## 2021-03-01 DIAGNOSIS — M79.672 BILATERAL FOOT PAIN: Primary | ICD-10-CM

## 2021-03-01 DIAGNOSIS — M72.2 PLANTAR FASCIITIS: ICD-10-CM

## 2021-03-01 DIAGNOSIS — B35.3 TINEA PEDIS OF BOTH FEET: ICD-10-CM

## 2021-03-01 DIAGNOSIS — M79.671 BILATERAL FOOT PAIN: Primary | ICD-10-CM

## 2021-03-01 PROCEDURE — 20550 NJX 1 TENDON SHEATH/LIGAMENT: CPT | Performed by: PODIATRIST

## 2021-03-01 PROCEDURE — 99214 OFFICE O/P EST MOD 30 MIN: CPT | Performed by: PODIATRIST

## 2021-03-01 RX ORDER — BUPIVACAINE HYDROCHLORIDE 5 MG/ML
5 INJECTION, SOLUTION PERINEURAL ONCE
Status: COMPLETED | OUTPATIENT
Start: 2021-03-01 | End: 2021-03-01

## 2021-03-01 RX ORDER — TRIAMCINOLONE ACETONIDE 40 MG/ML
10 INJECTION, SUSPENSION INTRA-ARTICULAR; INTRAMUSCULAR ONCE
Status: COMPLETED | OUTPATIENT
Start: 2021-03-01 | End: 2021-03-01

## 2021-03-01 RX ORDER — CLOTRIMAZOLE AND BETAMETHASONE DIPROPIONATE 10; .64 MG/G; MG/G
CREAM TOPICAL 2 TIMES DAILY
Qty: 45 G | Refills: 1 | Status: SHIPPED | OUTPATIENT
Start: 2021-03-01 | End: 2021-03-09

## 2021-03-01 RX ORDER — AMITRIPTYLINE HYDROCHLORIDE 10 MG/1
10 TABLET, FILM COATED ORAL DAILY
COMMUNITY
Start: 2021-02-02 | End: 2021-03-01

## 2021-03-01 RX ORDER — BLOOD SUGAR DIAGNOSTIC
STRIP MISCELLANEOUS
COMMUNITY
Start: 2021-02-01 | End: 2021-05-03

## 2021-03-01 RX ORDER — IBUPROFEN 800 MG/1
800 TABLET ORAL EVERY 8 HOURS PRN
COMMUNITY
Start: 2021-02-22 | End: 2021-03-01

## 2021-03-01 RX ORDER — MELOXICAM 15 MG/1
15 TABLET ORAL DAILY
Qty: 30 TABLET | Refills: 1 | Status: SHIPPED | OUTPATIENT
Start: 2021-03-01 | End: 2021-03-29

## 2021-03-01 RX ORDER — ONDANSETRON 4 MG/1
4 TABLET, ORALLY DISINTEGRATING ORAL EVERY 8 HOURS PRN
COMMUNITY
Start: 2021-01-04 | End: 2022-09-21

## 2021-03-01 RX ORDER — DEXAMETHASONE SODIUM PHOSPHATE 4 MG/ML
4 INJECTION, SOLUTION INTRA-ARTICULAR; INTRALESIONAL; INTRAMUSCULAR; INTRAVENOUS; SOFT TISSUE ONCE
Status: COMPLETED | OUTPATIENT
Start: 2021-03-01 | End: 2021-03-01

## 2021-03-01 RX ORDER — BLOOD SUGAR DIAGNOSTIC
1 STRIP MISCELLANEOUS DAILY
COMMUNITY
Start: 2021-02-01

## 2021-03-01 RX ORDER — TOPIRAMATE 50 MG/1
50 TABLET, FILM COATED ORAL 2 TIMES DAILY
COMMUNITY
Start: 2020-12-08 | End: 2021-07-12

## 2021-03-01 RX ORDER — HYOSCYAMINE SULFATE EXTENDED-RELEASE 0.38 MG/1
375 TABLET ORAL EVERY 12 HOURS
COMMUNITY
Start: 2021-02-08 | End: 2021-03-11

## 2021-03-01 RX ORDER — DEXLANSOPRAZOLE 60 MG/1
1 CAPSULE, DELAYED RELEASE ORAL NIGHTLY
COMMUNITY
Start: 2021-02-01 | End: 2021-07-26

## 2021-03-01 RX ORDER — DEXLANSOPRAZOLE 60 MG/1
60 CAPSULE, DELAYED RELEASE ORAL DAILY
COMMUNITY
Start: 2020-12-08 | End: 2021-03-01

## 2021-03-01 RX ORDER — PROMETHAZINE HYDROCHLORIDE 12.5 MG/1
TABLET ORAL
COMMUNITY
Start: 2020-12-17 | End: 2021-03-01

## 2021-03-01 RX ORDER — CYCLOBENZAPRINE HCL 10 MG
10 TABLET ORAL DAILY PRN
COMMUNITY
Start: 2020-12-28 | End: 2021-07-12

## 2021-03-01 RX ORDER — TERBINAFINE HYDROCHLORIDE 250 MG/1
250 TABLET ORAL DAILY
Qty: 14 TABLET | Refills: 0 | Status: SHIPPED | OUTPATIENT
Start: 2021-03-01 | End: 2022-07-27

## 2021-03-01 RX ADMIN — DEXAMETHASONE SODIUM PHOSPHATE 4 MG: 4 INJECTION, SOLUTION INTRA-ARTICULAR; INTRALESIONAL; INTRAMUSCULAR; INTRAVENOUS; SOFT TISSUE at 15:08

## 2021-03-01 RX ADMIN — BUPIVACAINE HYDROCHLORIDE 1 ML: 5 INJECTION, SOLUTION PERINEURAL at 15:09

## 2021-03-01 RX ADMIN — BUPIVACAINE HYDROCHLORIDE 1 ML: 5 INJECTION, SOLUTION PERINEURAL at 15:08

## 2021-03-01 RX ADMIN — TRIAMCINOLONE ACETONIDE 10 MG: 40 INJECTION, SUSPENSION INTRA-ARTICULAR; INTRAMUSCULAR at 15:06

## 2021-03-01 RX ADMIN — TRIAMCINOLONE ACETONIDE 10 MG: 40 INJECTION, SUSPENSION INTRA-ARTICULAR; INTRAMUSCULAR at 15:07

## 2021-03-01 RX ADMIN — DEXAMETHASONE SODIUM PHOSPHATE 4 MG: 4 INJECTION, SOLUTION INTRA-ARTICULAR; INTRALESIONAL; INTRAMUSCULAR; INTRAVENOUS; SOFT TISSUE at 15:07

## 2021-03-01 NOTE — PROGRESS NOTES
"Matilde Lopez  1985  36 y.o. female   CARINE Cano - 10/30/2020  BS - 132 per patient     Patient returns to clinic for bilateral foot pain. Patient states her pain her is 8/10. Xray obtained today.      2021     Chief Complaint   Patient presents with   • Left Foot - Pain   • Right Foot - Pain       History of Present Illness    Matilde Lopez is a 36 y.o.female who presents to clinic today with chief complaint of foot pain.  Pain is primarily located to the bottom of both of her heels.  It has been progressively getting worse over the last couple months.  She has previously been treated with steroid injections which helped significantly.  Currently she rates her pain as an 8 out of 10 and is requesting repeat steroid injection.  She also complains of rash to the bottom of both her feet.  Rashes been present for greater than 1 month and is progressively getting worse.  She has tried over-the-counter antifungals which have not helped.  States that the rash does itch.  She has no other complaints.    Past Medical History:   Diagnosis Date   • Anemia    • Anxiety    • Asthma    • Diabetes (CMS/[facility])    • GERD (gastroesophageal reflux disease)    • Gout    • History of stomach ulcers    • Ingrown toenail    • Kidney disease    • Migraine    • Plantar fasciitis          Past Surgical History:   Procedure Laterality Date   •  SECTION     •  SECTION     • CHOLECYSTECTOMY     • GALLBLADDER SURGERY     • HERNIA REPAIR     • HYSTERECTOMY     • SUBTOTAL HYSTERECTOMY           Family History   Problem Relation Age of Onset   • Heart disease Other    • Hypertension Other    • Diabetes Other    • Rashes / Skin problems Mother    • Heart disease Father    • Diabetes Father    • Hypertension Father        Allergies   Allergen Reactions   • Bee Venom Anaphylaxis   • Imitrex [Sumatriptan] Anaphylaxis     \" Throat swells and rash on neck \"   • Iodinated Diagnostic Agents Unknown (See Comments)     unknown "       Social History     Socioeconomic History   • Marital status:      Spouse name: Not on file   • Number of children: Not on file   • Years of education: Not on file   • Highest education level: Not on file   Tobacco Use   • Smoking status: Never Smoker   • Smokeless tobacco: Never Used   Substance and Sexual Activity   • Alcohol use: No     Frequency: Never   • Drug use: No   • Sexual activity: Defer   Social History Narrative    ** Merged History Encounter **              Current Outpatient Medications   Medication Sig Dispense Refill   • amitriptyline (ELAVIL) 10 MG tablet Take 10 mg by mouth Daily.     • cyclobenzaprine (FLEXERIL) 10 MG tablet Take 10 mg by mouth Daily As Needed.     • Dexilant 60 MG capsule Take 1 capsule by mouth Daily.     • EPINEPHrine (EPIPEN IJ) Inject  as directed.     • Ferrous Sulfate (IRON PO) Take  by mouth.     • glucose blood (OneTouch Ultra) test strip USE TO TEST BLOOD SUGAR EVERY DAY     • hyoscyamine (LEVBID) 0.375 MG 12 hr tablet Take 375 mcg by mouth Every 12 (Twelve) Hours.     • ibuprofen (ADVIL,MOTRIN) 800 MG tablet Take 800 mg by mouth Every 6 (Six) Hours As Needed for Mild Pain .     • Multiple Vitamin (MULTIVITAMIN PO) Take  by mouth.     • ondansetron ODT (ZOFRAN-ODT) 4 MG disintegrating tablet TAKE 1 TABLET BY MOUTH EVERY EIGHT HOURS AS NEEDED FOR NAUSEA     • OneTouch Ultra test strip 1 each by Other route Daily.     • polyethylene glycol (MIRALAX) packet Take 17 g by mouth Daily.     • promethazine (PHENERGAN) 25 MG tablet Take 1 tablet by mouth Every 6 (Six) Hours As Needed for Nausea or Vomiting. 15 tablet 0   • topiramate (TOPAMAX) 50 MG tablet Take 50 mg by mouth 2 (Two) Times a Day.     • VITAMIN D PO Take  by mouth.     • clotrimazole-betamethasone (LOTRISONE) 1-0.05 % cream Apply  topically to the appropriate area as directed 2 (Two) Times a Day. 45 g 1   • meloxicam (MOBIC) 15 MG tablet Take 1 tablet by mouth Daily. 30 tablet 1   • metFORMIN  "(GLUCOPHAGE) 500 MG tablet Take 500 mg by mouth Daily.     • terbinafine (LamISIL) 250 MG tablet Take 1 tablet by mouth Daily. 14 tablet 0     No current facility-administered medications for this visit.        Review of Systems   Constitutional: Negative.    Eyes: Negative.    Respiratory: Negative.    Cardiovascular: Negative.    Gastrointestinal: Negative.    Musculoskeletal:        Heel pain bilateral    Skin: Positive for rash.   Neurological: Negative.    Psychiatric/Behavioral: Negative.          OBJECTIVE    Pulse 73   Ht 165.1 cm (65\")   Wt 93.4 kg (206 lb)   LMP  (LMP Unknown)   SpO2 99%   BMI 34.28 kg/m²       Physical Exam  Vitals signs reviewed.   Constitutional:       General: She is not in acute distress.     Appearance: She is well-developed.   HENT:      Head: Normocephalic and atraumatic.      Nose: Nose normal.   Eyes:      Conjunctiva/sclera: Conjunctivae normal.      Pupils: Pupils are equal, round, and reactive to light.   Cardiovascular:      Rate and Rhythm: Normal rate.      Pulses: Normal pulses.   Pulmonary:      Effort: Pulmonary effort is normal. No respiratory distress.      Breath sounds: No wheezing.   Musculoskeletal: Normal range of motion.         General: Tenderness present. No deformity.   Skin:     General: Skin is warm and dry.      Capillary Refill: Capillary refill takes less than 2 seconds.      Findings: Rash present.   Neurological:      Mental Status: She is alert and oriented to person, place, and time.   Psychiatric:         Behavior: Behavior normal.         Thought Content: Thought content normal.         Gait: Normal    Assistive Device: None    Lower Extremity:     Cardiovascular:    DP/PT pulses palpable    CFT brisk  to all digits  Skin temp is warm to warm from proximal tibia to distal digits  Pedal hair growth present.   Musculoskeletal:  Muscle strength is 5/5 for all muscle groups tested   ROM of the 1st MTP is full without pain or crepitus  ROM of the " MTJ is full without pain or crepitus    ROM of the STJ is full without pain or crepitus    ROM of the ankle joint is full without pain or crepitus    Pain on palpation to the medial tubercle bilateral calcaneus.  Negative squeeze test.  Dermatological:   Dry scaling erythematous skin noted to the plantar arch bilateral  Webspaces 1-4 bilateral are clean, dry and intact.   No subcutaneous nodules or masses noted    No open wounds noted   Neurological:   Protective sensation intact    Sensation intact to light touch      Procedures    Plantar Fasciits Injection  Date/Time: 03/01/2021  Performed by: DARLYN MORALES  Authorized by: DARLYN MORALES   Consent: Verbal consent obtained. Written consent obtained.  Risks and benefits: risks, benefits and alternatives were discussed  Consent given by: patient  Patient identity confirmed: verbally with patient  Indications: pain relief  Nerve block body site: right and left heel.  Sedation:  Patient sedated: no    Patient position: sitting  Needle size: 27 G  Local anesthetic: 0.5% Marcaine plain, Kenalog 40 mg/ml , Decadron 4 mg/mL.   Outcome: pain improved  Patient tolerance: Patient tolerated the procedure well with no immediate complications     ASSESSMENT AND PLAN    Diagnoses and all orders for this visit:    1. Bilateral foot pain (Primary)  -     XR foot 3+ vw bilateral; Future  -     bupivacaine (MARCAINE) 0.5 % injection 1 mL  -     dexamethasone (DECADRON) injection 4 mg  -     triamcinolone acetonide (KENALOG-40) injection 10 mg  -     bupivacaine (MARCAINE) 0.5 % injection 1 mL  -     triamcinolone acetonide (KENALOG-40) injection 10 mg  -     dexamethasone (DECADRON) injection 4 mg    2. Tinea pedis of both feet  -     bupivacaine (MARCAINE) 0.5 % injection 1 mL  -     dexamethasone (DECADRON) injection 4 mg  -     triamcinolone acetonide (KENALOG-40) injection 10 mg  -     bupivacaine (MARCAINE) 0.5 % injection 1 mL  -     triamcinolone acetonide (KENALOG-40)  injection 10 mg  -     dexamethasone (DECADRON) injection 4 mg    3. Plantar fasciitis  -     bupivacaine (MARCAINE) 0.5 % injection 1 mL  -     dexamethasone (DECADRON) injection 4 mg  -     triamcinolone acetonide (KENALOG-40) injection 10 mg  -     bupivacaine (MARCAINE) 0.5 % injection 1 mL  -     triamcinolone acetonide (KENALOG-40) injection 10 mg  -     dexamethasone (DECADRON) injection 4 mg    Other orders  -     terbinafine (LamISIL) 250 MG tablet; Take 1 tablet by mouth Daily.  Dispense: 14 tablet; Refill: 0  -     clotrimazole-betamethasone (LOTRISONE) 1-0.05 % cream; Apply  topically to the appropriate area as directed 2 (Two) Times a Day.  Dispense: 45 g; Refill: 1  -     meloxicam (MOBIC) 15 MG tablet; Take 1 tablet by mouth Daily.  Dispense: 30 tablet; Refill: 1        - Comprehensive foot and ankle exam performed  - X-rays taken and reviewed.  No acute osseous or articular abnormalities.  - Steroid injection bilateral heels  - Rx for Lamisil and Lotrisone for tinea pedis  - Patient advised to stretch, ice and to make appropriate shoe gear changes to include wearing athletic type shoes with supportive insoles. Patient was given written instructions on how to correctly perform the stretching of the Achilles tendon/calf stretches, and the heel spur/plantar fasciitis regimen. Limit bare foot walking.    - Recommended over-the-counter insole such as power steps, spenco or walk fit  to properly support the arch in order to alleviate the tension and stress on the plantar fascia associated with normal daily walking. Patient was educated on the break-in period for new arch supports.  - All questions were answered to the patient's satisfaction.  - RTC in 6-8 weeks as needed             This document has been electronically signed by Floyd Tuttle DPM on March 2, 2021 10:49 CST     3/2/2021  10:49 CST

## 2021-03-09 RX ORDER — CLOTRIMAZOLE AND BETAMETHASONE DIPROPIONATE 10; .64 MG/G; MG/G
CREAM TOPICAL
Qty: 30 EACH | Refills: 0 | Status: SHIPPED | OUTPATIENT
Start: 2021-03-09 | End: 2021-04-27

## 2021-03-29 RX ORDER — MELOXICAM 15 MG/1
15 TABLET ORAL DAILY
Qty: 30 TABLET | Refills: 0 | Status: SHIPPED | OUTPATIENT
Start: 2021-03-29 | End: 2022-07-27

## 2021-04-27 RX ORDER — CLOTRIMAZOLE AND BETAMETHASONE DIPROPIONATE 10; .64 MG/G; MG/G
CREAM TOPICAL
Qty: 30 EACH | Refills: 0 | Status: SHIPPED | OUTPATIENT
Start: 2021-04-27 | End: 2021-06-28

## 2021-06-14 ENCOUNTER — OFFICE VISIT (OUTPATIENT)
Dept: PODIATRY | Facility: CLINIC | Age: 36
End: 2021-06-14

## 2021-06-14 VITALS
BODY MASS INDEX: 34.32 KG/M2 | OXYGEN SATURATION: 99 % | HEART RATE: 73 BPM | DIASTOLIC BLOOD PRESSURE: 62 MMHG | SYSTOLIC BLOOD PRESSURE: 120 MMHG | HEIGHT: 65 IN | WEIGHT: 206 LBS

## 2021-06-14 DIAGNOSIS — M79.672 FOOT PAIN, BILATERAL: Primary | ICD-10-CM

## 2021-06-14 DIAGNOSIS — M79.671 FOOT PAIN, BILATERAL: Primary | ICD-10-CM

## 2021-06-14 DIAGNOSIS — M72.2 PLANTAR FASCIITIS: ICD-10-CM

## 2021-06-14 PROCEDURE — 20550 NJX 1 TENDON SHEATH/LIGAMENT: CPT | Performed by: PODIATRIST

## 2021-06-14 RX ORDER — BUPIVACAINE HYDROCHLORIDE 5 MG/ML
1 INJECTION, SOLUTION EPIDURAL; INTRACAUDAL ONCE
Status: COMPLETED | OUTPATIENT
Start: 2021-06-14 | End: 2021-06-14

## 2021-06-14 RX ORDER — TRIAMCINOLONE ACETONIDE 40 MG/ML
10 INJECTION, SUSPENSION INTRA-ARTICULAR; INTRAMUSCULAR ONCE
Status: COMPLETED | OUTPATIENT
Start: 2021-06-14 | End: 2021-06-14

## 2021-06-14 RX ORDER — DEXAMETHASONE SODIUM PHOSPHATE 10 MG/ML
5 INJECTION INTRAMUSCULAR; INTRAVENOUS ONCE
Status: COMPLETED | OUTPATIENT
Start: 2021-06-14 | End: 2021-06-14

## 2021-06-14 RX ADMIN — DEXAMETHASONE SODIUM PHOSPHATE 5 MG: 10 INJECTION INTRAMUSCULAR; INTRAVENOUS at 17:35

## 2021-06-14 RX ADMIN — BUPIVACAINE HYDROCHLORIDE 1 ML: 5 INJECTION, SOLUTION EPIDURAL; INTRACAUDAL at 17:35

## 2021-06-14 RX ADMIN — TRIAMCINOLONE ACETONIDE 10 MG: 40 INJECTION, SUSPENSION INTRA-ARTICULAR; INTRAMUSCULAR at 17:36

## 2021-06-14 RX ADMIN — BUPIVACAINE HYDROCHLORIDE 1 ML: 5 INJECTION, SOLUTION EPIDURAL; INTRACAUDAL at 17:34

## 2021-06-14 NOTE — PROGRESS NOTES
"Matilde Lopez  1985  36 y.o. female   CARINE Cano - 10/30/2020  BS - 97 per patient     Patient returns to clinic for bilateral foot pain. Patient states her pain her is 8/10 and would like injections.     2021     Chief Complaint   Patient presents with   • Left Foot - Plantar Fasciitis   • Right Foot - Plantar Fasciitis       History of Present Illness    Matilde Lopez is a 36 y.o.female who presents to clinic today with recurrent heel pain.  She rates her pain as a 8 out of 10.    Past Medical History:   Diagnosis Date   • Anemia    • Anxiety    • Asthma    • Diabetes (CMS/HCC)    • GERD (gastroesophageal reflux disease)    • Gout    • History of stomach ulcers    • Ingrown toenail    • Kidney disease    • Migraine    • Plantar fasciitis          Past Surgical History:   Procedure Laterality Date   •  SECTION     •  SECTION     • CHOLECYSTECTOMY     • GALLBLADDER SURGERY     • HERNIA REPAIR     • HYSTERECTOMY     • SUBTOTAL HYSTERECTOMY           Family History   Problem Relation Age of Onset   • Heart disease Other    • Hypertension Other    • Diabetes Other    • Rashes / Skin problems Mother    • Heart disease Father    • Diabetes Father    • Hypertension Father        Allergies   Allergen Reactions   • Bee Venom Anaphylaxis   • Imitrex [Sumatriptan] Anaphylaxis     \" Throat swells and rash on neck \"   • Iodinated Diagnostic Agents Unknown (See Comments)     unknown       Social History     Socioeconomic History   • Marital status:      Spouse name: Not on file   • Number of children: Not on file   • Years of education: Not on file   • Highest education level: Not on file   Tobacco Use   • Smoking status: Never Smoker   • Smokeless tobacco: Never Used   Vaping Use   • Vaping Use: Never used   Substance and Sexual Activity   • Alcohol use: No   • Drug use: No   • Sexual activity: Defer         Current Outpatient Medications   Medication Sig Dispense Refill   • " "clotrimazole-betamethasone (LOTRISONE) 1-0.05 % cream APPLY TO AFFECTED AREA TWO TIMES DAILY 30 each 0   • cyclobenzaprine (FLEXERIL) 10 MG tablet Take 10 mg by mouth Daily As Needed.     • Dexilant 60 MG capsule Take 1 capsule by mouth Daily.     • EPINEPHrine (EPIPEN IJ) Inject  as directed.     • Ferrous Sulfate (IRON PO) Take  by mouth.     • ibuprofen (ADVIL,MOTRIN) 800 MG tablet Take 800 mg by mouth Every 6 (Six) Hours As Needed for Mild Pain .     • meloxicam (MOBIC) 15 MG tablet TAKE 1 TABLET BY MOUTH DAILY 30 tablet 0   • Multiple Vitamin (MULTIVITAMIN PO) Take  by mouth.     • ondansetron ODT (ZOFRAN-ODT) 4 MG disintegrating tablet TAKE 1 TABLET BY MOUTH EVERY EIGHT HOURS AS NEEDED FOR NAUSEA     • OneTouch Ultra test strip 1 each by Other route Daily.     • polyethylene glycol (MIRALAX) packet Take 17 g by mouth Daily.     • promethazine (PHENERGAN) 25 MG tablet Take 1 tablet by mouth Every 6 (Six) Hours As Needed for Nausea or Vomiting. 15 tablet 0   • terbinafine (LamISIL) 250 MG tablet Take 1 tablet by mouth Daily. 14 tablet 0   • topiramate (TOPAMAX) 50 MG tablet Take 50 mg by mouth 2 (Two) Times a Day.     • VITAMIN D PO Take  by mouth.     • metFORMIN (GLUCOPHAGE) 500 MG tablet Take 500 mg by mouth Daily.       No current facility-administered medications for this visit.       Review of Systems   Constitutional: Negative.    Eyes: Negative.    Respiratory: Negative.    Cardiovascular: Negative.    Gastrointestinal: Negative.    Musculoskeletal:        Heel pain bilateral    Skin: Positive for rash.   Neurological: Negative.    Psychiatric/Behavioral: Negative.          OBJECTIVE    /62   Pulse 73   Ht 165.1 cm (65\")   Wt 93.4 kg (206 lb)   LMP  (LMP Unknown)   SpO2 99%   BMI 34.28 kg/m²       Physical Exam  Vitals reviewed.   Constitutional:       General: She is not in acute distress.     Appearance: She is well-developed.   HENT:      Head: Normocephalic and atraumatic.      Nose: Nose " normal.   Eyes:      Conjunctiva/sclera: Conjunctivae normal.      Pupils: Pupils are equal, round, and reactive to light.   Cardiovascular:      Rate and Rhythm: Normal rate.      Pulses: Normal pulses.   Pulmonary:      Effort: Pulmonary effort is normal. No respiratory distress.      Breath sounds: No wheezing.   Musculoskeletal:         General: Tenderness present. No deformity. Normal range of motion.   Skin:     General: Skin is warm and dry.      Capillary Refill: Capillary refill takes less than 2 seconds.      Findings: Rash present.   Neurological:      Mental Status: She is alert and oriented to person, place, and time.   Psychiatric:         Behavior: Behavior normal.         Thought Content: Thought content normal.         Gait: Normal    Assistive Device: None    Lower Extremity:     Cardiovascular:    DP/PT pulses palpable    CFT brisk  to all digits  Skin temp is warm to warm from proximal tibia to distal digits  Pedal hair growth present.   Musculoskeletal:  Muscle strength is 5/5 for all muscle groups tested   ROM of the 1st MTP is full without pain or crepitus  ROM of the MTJ is full without pain or crepitus    ROM of the STJ is full without pain or crepitus    ROM of the ankle joint is full without pain or crepitus    Pain on palpation to the medial tubercle bilateral calcaneus.  Negative squeeze test.  Dermatological:   Dry scaling erythematous skin noted to the plantar arch bilateral  Webspaces 1-4 bilateral are clean, dry and intact.   No subcutaneous nodules or masses noted    No open wounds noted   Neurological:   Protective sensation intact    Sensation intact to light touch      Procedures    Plantar Fasciits Injection  Date/Time: 06/14/2021   Performed by: DARLYN MORALES  Authorized by: DARLYN MORALES   Consent: Verbal consent obtained. Written consent obtained.  Risks and benefits: risks, benefits and alternatives were discussed  Consent given by: patient  Patient identity confirmed:  verbally with patient  Indications: pain relief  Nerve block body site: right and left heel.  Sedation:  Patient sedated: no    Patient position: sitting  Needle size: 27 G  Local anesthetic: 0.5% Marcaine plain, Kenalog 40 mg/ml , Decadron 4 mg/mL.   Outcome: pain improved  Patient tolerance: Patient tolerated the procedure well with no immediate complications     ASSESSMENT AND PLAN    Diagnoses and all orders for this visit:    1. Foot pain, bilateral (Primary)    2. Plantar fasciitis        -Repeat steroid injection bilateral heels.  -Reinforced stretching, icing, anti-inflammatories and good supportive shoe gear.  Briefly discussed plantar fasciotomy if needed in future.  - All questions were answered to the patient's satisfaction.  - RTC in 6-8 weeks as needed             This document has been electronically signed by Floyd Tuttle DPM on June 14, 2021 12:38 CDT     6/14/2021  12:38 CDT

## 2021-06-28 RX ORDER — CLOTRIMAZOLE AND BETAMETHASONE DIPROPIONATE 10; .64 MG/G; MG/G
CREAM TOPICAL
Qty: 30 EACH | Refills: 0 | Status: SHIPPED | OUTPATIENT
Start: 2021-06-28 | End: 2021-07-12

## 2021-06-29 ENCOUNTER — OFFICE VISIT (OUTPATIENT)
Dept: PODIATRY | Facility: CLINIC | Age: 36
End: 2021-06-29

## 2021-06-29 VITALS
WEIGHT: 206 LBS | SYSTOLIC BLOOD PRESSURE: 114 MMHG | DIASTOLIC BLOOD PRESSURE: 80 MMHG | OXYGEN SATURATION: 97 % | BODY MASS INDEX: 34.28 KG/M2 | HEART RATE: 62 BPM

## 2021-06-29 DIAGNOSIS — M72.2 PLANTAR FASCIITIS: Primary | ICD-10-CM

## 2021-06-29 PROCEDURE — 99214 OFFICE O/P EST MOD 30 MIN: CPT | Performed by: PODIATRIST

## 2021-06-29 RX ORDER — METOCLOPRAMIDE 10 MG/1
10 TABLET ORAL 4 TIMES DAILY
COMMUNITY
Start: 2021-06-10 | End: 2022-08-08

## 2021-06-29 RX ORDER — BUPIVACAINE HCL/0.9 % NACL/PF 0.1 %
2 PLASTIC BAG, INJECTION (ML) EPIDURAL ONCE
Status: CANCELLED | OUTPATIENT
Start: 2021-07-15 | End: 2021-06-29

## 2021-06-29 NOTE — PROGRESS NOTES
"Matilde Lopez  1985  36 y.o. female   CARINE Cano - 10/30/2020  BS - 98 per patient     2021     Chief Complaint   Patient presents with   • Right Foot - Follow-up, Plantar Fasciitis   • Left Foot - Follow-up, Plantar Fasciitis       History of Present Illness    Matilde Lopez is a 36 y.o.female who presents to clinic today with chief complaint of heel pain.  Patient has failed multiple conservative measures up to this point.  She rates the pain in her right heel as a 9 out of 10.    Past Medical History:   Diagnosis Date   • Anemia    • Anxiety    • Asthma    • Diabetes (CMS/HCC)    • GERD (gastroesophageal reflux disease)    • Gout    • History of stomach ulcers    • Ingrown toenail    • Kidney disease    • Migraine    • Plantar fasciitis          Past Surgical History:   Procedure Laterality Date   •  SECTION     •  SECTION     • CHOLECYSTECTOMY     • GALLBLADDER SURGERY     • HERNIA REPAIR     • HYSTERECTOMY     • SUBTOTAL HYSTERECTOMY           Family History   Problem Relation Age of Onset   • Heart disease Other    • Hypertension Other    • Diabetes Other    • Rashes / Skin problems Mother    • Heart disease Father    • Diabetes Father    • Hypertension Father        Allergies   Allergen Reactions   • Bee Venom Anaphylaxis   • Imitrex [Sumatriptan] Anaphylaxis     \" Throat swells and rash on neck \"   • Iodinated Diagnostic Agents Unknown (See Comments)     unknown       Social History     Socioeconomic History   • Marital status:      Spouse name: Not on file   • Number of children: Not on file   • Years of education: Not on file   • Highest education level: Not on file   Tobacco Use   • Smoking status: Never Smoker   • Smokeless tobacco: Never Used   Vaping Use   • Vaping Use: Never used   Substance and Sexual Activity   • Alcohol use: No   • Drug use: No   • Sexual activity: Defer         Current Outpatient Medications   Medication Sig Dispense Refill   • " clotrimazole-betamethasone (LOTRISONE) 1-0.05 % cream APPLY TO AFFECTED AREA TWO TIMES DAILY 30 each 0   • cyclobenzaprine (FLEXERIL) 10 MG tablet Take 10 mg by mouth Daily As Needed.     • Dexilant 60 MG capsule Take 1 capsule by mouth Daily.     • EPINEPHrine (EPIPEN IJ) Inject  as directed.     • Ferrous Sulfate (IRON PO) Take  by mouth.     • ibuprofen (ADVIL,MOTRIN) 800 MG tablet Take 800 mg by mouth Every 6 (Six) Hours As Needed for Mild Pain .     • meloxicam (MOBIC) 15 MG tablet TAKE 1 TABLET BY MOUTH DAILY 30 tablet 0   • metoclopramide (REGLAN) 10 MG tablet TAKE 1 TABLET BY MOUTH FOUR TIMES DAILY     • Multiple Vitamin (MULTIVITAMIN PO) Take  by mouth.     • ondansetron ODT (ZOFRAN-ODT) 4 MG disintegrating tablet TAKE 1 TABLET BY MOUTH EVERY EIGHT HOURS AS NEEDED FOR NAUSEA     • OneTouch Ultra test strip 1 each by Other route Daily.     • polyethylene glycol (MIRALAX) packet Take 17 g by mouth Daily.     • promethazine (PHENERGAN) 25 MG tablet Take 1 tablet by mouth Every 6 (Six) Hours As Needed for Nausea or Vomiting. 15 tablet 0   • terbinafine (LamISIL) 250 MG tablet Take 1 tablet by mouth Daily. 14 tablet 0   • topiramate (TOPAMAX) 50 MG tablet Take 50 mg by mouth 2 (Two) Times a Day.     • VITAMIN D PO Take  by mouth.     • metFORMIN (GLUCOPHAGE) 500 MG tablet Take 500 mg by mouth Daily.       No current facility-administered medications for this visit.       Review of Systems   Constitutional: Negative.    HENT: Negative.    Eyes: Negative.    Respiratory: Negative.    Cardiovascular: Negative.    Gastrointestinal: Negative.    Musculoskeletal:        Heel pain     Skin: Negative.    Neurological: Negative.    Psychiatric/Behavioral: Negative.          OBJECTIVE    /80   Pulse 62   Wt 93.4 kg (206 lb)   LMP  (LMP Unknown)   SpO2 97%   BMI 34.28 kg/m²       Physical Exam  Vitals reviewed.   Constitutional:       General: She is not in acute distress.     Appearance: She is well-developed.    HENT:      Head: Normocephalic and atraumatic.      Nose: Nose normal.   Eyes:      Conjunctiva/sclera: Conjunctivae normal.      Pupils: Pupils are equal, round, and reactive to light.   Cardiovascular:      Rate and Rhythm: Normal rate.      Pulses: Normal pulses.   Pulmonary:      Effort: Pulmonary effort is normal. No respiratory distress.      Breath sounds: No wheezing.   Musculoskeletal:         General: Tenderness present. No deformity. Normal range of motion.   Skin:     General: Skin is warm and dry.      Capillary Refill: Capillary refill takes less than 2 seconds.   Neurological:      Mental Status: She is alert and oriented to person, place, and time.   Psychiatric:         Behavior: Behavior normal.         Thought Content: Thought content normal.         Gait: Normal    Assistive Device: None    Lower Extremity:     Cardiovascular:    DP/PT pulses palpable    CFT brisk  to all digits  Skin temp is warm to warm from proximal tibia to distal digits  Pedal hair growth present.   Musculoskeletal:  Muscle strength is 5/5 for all muscle groups tested   ROM of the 1st MTP is full without pain or crepitus  ROM of the MTJ is full without pain or crepitus    ROM of the STJ is full without pain or crepitus    ROM of the ankle joint is full without pain or crepitus    Significant pain on palpation to the medial tubercle of the right calcaneus.  Negative lateral squeeze test.  Dermatological:   Webspaces 1-4 bilateral are clean, dry and intact.   No subcutaneous nodules or masses noted    No open wounds noted   Neurological:   Protective sensation intact    Sensation intact to light touch      Procedures        ASSESSMENT AND PLAN    Diagnoses and all orders for this visit:    1. Plantar fasciitis (Primary)  -     Case Request; Standing  -     ceFAZolin (ANCEF) 2 g in sodium chloride 0.9 % 100 mL IVPB  -     Case Request    Other orders  -     Follow Anesthesia Guidelines / Standing Orders; Standing  -      Verify NPO Status; Standing  -     Obtain informed consent (if not collected inpatient or PAT); Standing  -     Notify Physician - Standard; Standing  -     Follow Anesthesia Guidelines / Standing Orders; Future        -Continued heel pain despite multiple conservative measures.  Discussed treatment options going forward both conservative and surgical.  Patient elected for surgical intervention.  -Surgical plan is plantar fasciotomy right foot  -Risks and benefits of the procedure including but not limited to postoperative infection, incisional dehiscence, numbness, swelling, residual pain and postoperative blood clot discussed in detail.  No guarantees were given or implied.  -Tentative date for the surgery July 15, 2021  -All questions were answered  -Recheck following surgery           This document has been electronically signed by Floyd Tuttle DPM on June 29, 2021 11:37 CDT     6/29/2021  11:37 CDT

## 2021-07-12 ENCOUNTER — PRE-ADMISSION TESTING (OUTPATIENT)
Dept: PREADMISSION TESTING | Facility: HOSPITAL | Age: 36
End: 2021-07-12

## 2021-07-12 ENCOUNTER — LAB (OUTPATIENT)
Dept: LAB | Facility: HOSPITAL | Age: 36
End: 2021-07-12

## 2021-07-12 VITALS
OXYGEN SATURATION: 98 % | HEART RATE: 78 BPM | BODY MASS INDEX: 34.82 KG/M2 | DIASTOLIC BLOOD PRESSURE: 84 MMHG | SYSTOLIC BLOOD PRESSURE: 120 MMHG | RESPIRATION RATE: 18 BRPM | WEIGHT: 209 LBS | HEIGHT: 65 IN

## 2021-07-12 DIAGNOSIS — Z01.818 PREOP TESTING: Primary | ICD-10-CM

## 2021-07-12 LAB
ANION GAP SERPL CALCULATED.3IONS-SCNC: 5 MMOL/L (ref 5–15)
BUN SERPL-MCNC: 12 MG/DL (ref 6–20)
BUN/CREAT SERPL: 14 (ref 7–25)
CALCIUM SPEC-SCNC: 9.2 MG/DL (ref 8.6–10.5)
CHLORIDE SERPL-SCNC: 96 MMOL/L (ref 98–107)
CO2 SERPL-SCNC: 30 MMOL/L (ref 22–29)
CREAT SERPL-MCNC: 0.86 MG/DL (ref 0.57–1)
GFR SERPL CREATININE-BSD FRML MDRD: 74 ML/MIN/1.73
GLUCOSE SERPL-MCNC: 86 MG/DL (ref 65–99)
POTASSIUM SERPL-SCNC: 3.9 MMOL/L (ref 3.5–5.2)
QT INTERVAL: 370 MS
QTC INTERVAL: 405 MS
SARS-COV-2 N GENE RESP QL NAA+PROBE: NOT DETECTED
SODIUM SERPL-SCNC: 131 MMOL/L (ref 136–145)

## 2021-07-12 PROCEDURE — 36415 COLL VENOUS BLD VENIPUNCTURE: CPT

## 2021-07-12 PROCEDURE — 87635 SARS-COV-2 COVID-19 AMP PRB: CPT

## 2021-07-12 PROCEDURE — 93005 ELECTROCARDIOGRAM TRACING: CPT

## 2021-07-12 PROCEDURE — 80048 BASIC METABOLIC PNL TOTAL CA: CPT

## 2021-07-12 PROCEDURE — 93010 ELECTROCARDIOGRAM REPORT: CPT | Performed by: INTERNAL MEDICINE

## 2021-07-12 PROCEDURE — C9803 HOPD COVID-19 SPEC COLLECT: HCPCS

## 2021-07-12 RX ORDER — CLOTRIMAZOLE AND BETAMETHASONE DIPROPIONATE 10; .64 MG/G; MG/G
CREAM TOPICAL 2 TIMES DAILY PRN
COMMUNITY
End: 2022-08-08

## 2021-07-12 RX ORDER — FLUTICASONE PROPIONATE 50 MCG
2 SPRAY, SUSPENSION (ML) NASAL DAILY PRN
COMMUNITY

## 2021-07-12 RX ORDER — BROMPHENIRAMINE MALEATE, PSEUDOEPHEDRINE HYDROCHLORIDE, AND DEXTROMETHORPHAN HYDROBROMIDE 2; 30; 10 MG/5ML; MG/5ML; MG/5ML
10 SYRUP ORAL 4 TIMES DAILY PRN
COMMUNITY
End: 2022-08-08

## 2021-07-12 RX ORDER — LUBIPROSTONE 8 UG/1
8 CAPSULE ORAL 2 TIMES DAILY WITH MEALS
COMMUNITY

## 2021-07-12 RX ORDER — PROPRANOLOL HYDROCHLORIDE 20 MG/1
20 TABLET ORAL 3 TIMES DAILY
COMMUNITY

## 2021-07-12 RX ORDER — ALBUTEROL SULFATE 90 UG/1
2 AEROSOL, METERED RESPIRATORY (INHALATION) EVERY 4 HOURS PRN
COMMUNITY

## 2021-07-12 RX ORDER — AMITRIPTYLINE HYDROCHLORIDE 10 MG/1
10 TABLET, FILM COATED ORAL NIGHTLY
COMMUNITY

## 2021-07-12 RX ORDER — SODIUM CHLORIDE 9 MG/ML
1000 INJECTION, SOLUTION INTRAVENOUS CONTINUOUS
Status: CANCELLED | OUTPATIENT
Start: 2021-07-15

## 2021-07-12 RX ORDER — CETIRIZINE HYDROCHLORIDE 10 MG/1
10 TABLET ORAL NIGHTLY
COMMUNITY

## 2021-07-12 NOTE — DISCHARGE INSTRUCTIONS
[facility]  Pre-op Information and Guidelines    You will be called after 2 p.m. the day before your surgery (Friday for Monday surgery) and notified of your time for arrival and approximate surgery time.  If you have not received a call by 4P.M., please contact Same Day Surgery at [phone] of if outside [facility] call [phone].    Please Follow these Important Safety Guidelines:    • The morning of your procedure, take only the medications listed below with   A sip of water:_____________________________________________       ______________________________________________    • DO NOT eat or drink anything after 12:00 midnight the night before surgery  Specific instructions concerning drinking clear liquids will be discussed during  the pre-surgery instruction call the day before your surgery.    • If you take a blood thinner (ex. Plavix, Coumadin, aspirin), ask your doctor when to stop it before surgery  STOP DATE: _________________    • Only 2 visitors are allowed in patient rooms at a time  Your visitors will be asked to wait in the lobby until the admission process is complete with the exception of a parent with a child and patients in need of special assistance.    • YOU CANNOT DRIVE YOURSELF HOME  You must be accompanied by someone who will be responsible for driving you home after surgery and for your care at home.    • DO NOT chew gum, use breath mints, hard candy, or smoke the day of surgery  • DO NOT drink alcohol for at least 24 hours before your surgery  • DO NOT wear any jewelry and remove all body piercing before coming to the hospital  • DO NOT wear make-up to the hospital  • If you are having surgery on an extremity (arm/leg/foot) remove nail polish/artificial nails on the surgical side  • Clothing, glasses, contacts, dentures, and hairpieces must be removed before surgery  • Bathe the night before or the morning of your surgery and do not use powders/lotions on  skin.

## 2021-07-12 NOTE — PAT
Chlorhexidine scrub given with instruction sheet.  Instruction reviewed in PAT, understanding verbalized.    Patient states she was seen in urgent care on 7-9 for URI.  Was only given zyrtec.  Denies fever or cough.  Dr Lynn made aware, no order received.

## 2021-07-14 ENCOUNTER — ANESTHESIA EVENT (OUTPATIENT)
Dept: PERIOP | Facility: HOSPITAL | Age: 36
End: 2021-07-14

## 2021-07-15 ENCOUNTER — HOSPITAL ENCOUNTER (OUTPATIENT)
Facility: HOSPITAL | Age: 36
Setting detail: HOSPITAL OUTPATIENT SURGERY
Discharge: HOME | End: 2021-07-15
Attending: PODIATRIST | Admitting: PODIATRIST

## 2021-07-15 ENCOUNTER — ANESTHESIA (OUTPATIENT)
Dept: PERIOP | Facility: HOSPITAL | Age: 36
End: 2021-07-15

## 2021-07-15 VITALS
WEIGHT: 204.37 LBS | TEMPERATURE: 96.8 F | SYSTOLIC BLOOD PRESSURE: 110 MMHG | RESPIRATION RATE: 16 BRPM | DIASTOLIC BLOOD PRESSURE: 57 MMHG | HEIGHT: 65 IN | OXYGEN SATURATION: 99 % | HEART RATE: 59 BPM | BODY MASS INDEX: 34.05 KG/M2

## 2021-07-15 DIAGNOSIS — M72.2 PLANTAR FASCIITIS: Primary | ICD-10-CM

## 2021-07-15 LAB
GLUCOSE BLDC GLUCOMTR-MCNC: 85 MG/DL (ref 70–130)
GLUCOSE BLDC GLUCOMTR-MCNC: 88 MG/DL (ref 70–130)

## 2021-07-15 PROCEDURE — 25010000002 MIDAZOLAM PER 1 MG: Performed by: NURSE ANESTHETIST, CERTIFIED REGISTERED

## 2021-07-15 PROCEDURE — 28008 INCISION OF FOOT FASCIA: CPT | Performed by: PODIATRIST

## 2021-07-15 PROCEDURE — 82962 GLUCOSE BLOOD TEST: CPT

## 2021-07-15 PROCEDURE — 25010000002 CEFAZOLIN PER 500 MG: Performed by: PODIATRIST

## 2021-07-15 PROCEDURE — 25010000002 ONDANSETRON PER 1 MG: Performed by: NURSE ANESTHETIST, CERTIFIED REGISTERED

## 2021-07-15 PROCEDURE — 25010000002 PROPOFOL 10 MG/ML EMULSION: Performed by: NURSE ANESTHETIST, CERTIFIED REGISTERED

## 2021-07-15 PROCEDURE — 25010000002 DEXAMETHASONE PER 1 MG: Performed by: PODIATRIST

## 2021-07-15 PROCEDURE — 25010000002 FENTANYL CITRATE (PF) 50 MCG/ML SOLUTION: Performed by: NURSE ANESTHETIST, CERTIFIED REGISTERED

## 2021-07-15 PROCEDURE — 25010000002 ROPIVACAINE PER 1 MG: Performed by: NURSE ANESTHETIST, CERTIFIED REGISTERED

## 2021-07-15 RX ORDER — MIDAZOLAM HYDROCHLORIDE 1 MG/ML
INJECTION INTRAMUSCULAR; INTRAVENOUS AS NEEDED
Status: DISCONTINUED | OUTPATIENT
Start: 2021-07-15 | End: 2021-07-15 | Stop reason: SURG

## 2021-07-15 RX ORDER — PROPOFOL 10 MG/ML
VIAL (ML) INTRAVENOUS AS NEEDED
Status: DISCONTINUED | OUTPATIENT
Start: 2021-07-15 | End: 2021-07-15 | Stop reason: SURG

## 2021-07-15 RX ORDER — FENTANYL CITRATE 50 UG/ML
INJECTION, SOLUTION INTRAMUSCULAR; INTRAVENOUS AS NEEDED
Status: DISCONTINUED | OUTPATIENT
Start: 2021-07-15 | End: 2021-07-15 | Stop reason: SURG

## 2021-07-15 RX ORDER — DEXAMETHASONE SODIUM PHOSPHATE 10 MG/ML
INJECTION INTRAMUSCULAR; INTRAVENOUS AS NEEDED
Status: DISCONTINUED | OUTPATIENT
Start: 2021-07-15 | End: 2021-07-15 | Stop reason: HOSPADM

## 2021-07-15 RX ORDER — LIDOCAINE HYDROCHLORIDE 20 MG/ML
INJECTION, SOLUTION INFILTRATION; PERINEURAL AS NEEDED
Status: DISCONTINUED | OUTPATIENT
Start: 2021-07-15 | End: 2021-07-15 | Stop reason: SURG

## 2021-07-15 RX ORDER — BUPIVACAINE HCL/0.9 % NACL/PF 0.1 %
2 PLASTIC BAG, INJECTION (ML) EPIDURAL ONCE
Status: COMPLETED | OUTPATIENT
Start: 2021-07-15 | End: 2021-07-15

## 2021-07-15 RX ORDER — SCOLOPAMINE TRANSDERMAL SYSTEM 1 MG/1
1 PATCH, EXTENDED RELEASE TRANSDERMAL CONTINUOUS
Status: DISCONTINUED | OUTPATIENT
Start: 2021-07-15 | End: 2021-07-15 | Stop reason: HOSPADM

## 2021-07-15 RX ORDER — BUPIVACAINE HYDROCHLORIDE 5 MG/ML
INJECTION, SOLUTION EPIDURAL; INTRACAUDAL AS NEEDED
Status: DISCONTINUED | OUTPATIENT
Start: 2021-07-15 | End: 2021-07-15 | Stop reason: HOSPADM

## 2021-07-15 RX ORDER — SODIUM CHLORIDE 9 MG/ML
1000 INJECTION, SOLUTION INTRAVENOUS CONTINUOUS
Status: DISCONTINUED | OUTPATIENT
Start: 2021-07-15 | End: 2021-07-15 | Stop reason: HOSPADM

## 2021-07-15 RX ORDER — OXYCODONE AND ACETAMINOPHEN 7.5; 325 MG/1; MG/1
1 TABLET ORAL EVERY 4 HOURS PRN
Qty: 30 TABLET | Refills: 0 | Status: SHIPPED | OUTPATIENT
Start: 2021-07-15 | End: 2021-07-19

## 2021-07-15 RX ORDER — ROPIVACAINE HYDROCHLORIDE 5 MG/ML
INJECTION, SOLUTION EPIDURAL; INFILTRATION; PERINEURAL
Status: COMPLETED | OUTPATIENT
Start: 2021-07-15 | End: 2021-07-15

## 2021-07-15 RX ORDER — ONDANSETRON 2 MG/ML
INJECTION INTRAMUSCULAR; INTRAVENOUS AS NEEDED
Status: DISCONTINUED | OUTPATIENT
Start: 2021-07-15 | End: 2021-07-15 | Stop reason: SURG

## 2021-07-15 RX ADMIN — ONDANSETRON 4 MG: 2 INJECTION INTRAMUSCULAR; INTRAVENOUS at 07:43

## 2021-07-15 RX ADMIN — PROPOFOL 150 MG: 10 INJECTION, EMULSION INTRAVENOUS at 07:19

## 2021-07-15 RX ADMIN — MIDAZOLAM HYDROCHLORIDE 2 MG: 2 INJECTION, SOLUTION INTRAMUSCULAR; INTRAVENOUS at 07:13

## 2021-07-15 RX ADMIN — ROPIVACAINE HYDROCHLORIDE 30 ML: 5 INJECTION, SOLUTION EPIDURAL; INFILTRATION; PERINEURAL at 07:31

## 2021-07-15 RX ADMIN — SCOLOPAMINE TRANSDERMAL SYSTEM 1 PATCH: 1 PATCH, EXTENDED RELEASE TRANSDERMAL at 06:33

## 2021-07-15 RX ADMIN — FENTANYL CITRATE 50 MCG: 50 INJECTION INTRAMUSCULAR; INTRAVENOUS at 07:19

## 2021-07-15 RX ADMIN — SODIUM CHLORIDE 1000 ML: 9 INJECTION, SOLUTION INTRAVENOUS at 06:33

## 2021-07-15 RX ADMIN — LIDOCAINE HYDROCHLORIDE 80 MG: 20 INJECTION, SOLUTION INFILTRATION; PERINEURAL at 07:19

## 2021-07-15 RX ADMIN — Medication 2 G: at 07:25

## 2021-07-15 NOTE — ANESTHESIA PROCEDURE NOTES
Peripheral Block      Patient reassessed immediately prior to procedure    Patient location during procedure: OR  Start time: 7/15/2021 6:48 AM  Stop time: 7/15/2021 7:02 AM  Reason for block: post-op pain management  Performed by  Anesthesiologist: Nakul Holland MD  Assisted by: Juan Jose Mirza SRNA  Preanesthetic Checklist  Completed: patient identified, IV checked, site marked, risks and benefits discussed, surgical consent, monitors and equipment checked, pre-op evaluation and timeout performed  Prep:  Pt Position: supine  Sterile barriers:cap, gloves and sterile barriers  Prep: Betadine  Patient monitoring: blood pressure monitoring, continuous pulse oximetry and EKG  Procedure  Sedation:no  Performed under: PNB  Guidance:ultrasound guided  ULTRASOUND INTERPRETATION.  Using ultrasound guidance a 21 G gauge needle was placed in close proximity to the femoral nerve, at which point, under ultrasound guidance anesthetic was injected in the area of the nerve and spread of the anesthesia was seen on ultrasound in close proximity thereto.  There were no abnormalities seen on ultrasound; a digital image was taken; and the patient tolerated the procedure with no complications. Images:still images obtained, printed/placed on chart    Laterality:right  Block Type:popliteal  Injection Technique:single-shot  Needle Type:echogenic      Medications Used: ropivacaine (NAROPIN) 0.5 % injection, 30 mL      Post Assessment  Injection Assessment: negative aspiration for heme, no paresthesia on injection and incremental injection  Patient Tolerance:comfortable throughout block  Complications:no

## 2021-07-15 NOTE — ANESTHESIA PROCEDURE NOTES
Airway  Urgency: elective    Date/Time: 7/15/2021 7:20 AM  Airway not difficult    General Information and Staff    Patient location during procedure: OR  CRNA: Radha Carlton CRNA    Indications and Patient Condition  Indications for airway management: airway protection    Preoxygenated: yes  Mask difficulty assessment: 0 - not attempted    Final Airway Details  Final airway type: supraglottic airway      Successful airway: I-gel  Size 4    Number of attempts at approach: 1  Assessment: lips, teeth, and gum same as pre-op and atraumatic intubation

## 2021-07-15 NOTE — ANESTHESIA PREPROCEDURE EVALUATION
Anesthesia Evaluation     Patient summary reviewed and Nursing notes reviewed   history of anesthetic complications (Scopalamine patch ordered pre-op.): PONV  NPO Solid Status: > 8 hours  NPO Liquid Status: > 8 hours           Airway   Mallampati: II  TM distance: >3 FB  Neck ROM: full  possible difficult intubation  Dental    (+) poor dentation    Pulmonary - normal exam    breath sounds clear to auscultation  (+) asthma,  (-) pulmonary embolism  Cardiovascular - negative cardio ROS and normal exam    ECG reviewed  Patient on routine beta blocker and Beta blocker given within 24 hours of surgery  Rhythm: regular  Rate: normal    (-) murmur    ROS comment: Normal sinus rhythm  Nonspecific ST and T wave abnormality  Abnormal ECG  When compared with ECG of 01-OCT-2017 23:15,  Inverted T waves have replaced nonspecific T wave abnormality in Inferior leads     Referred By:            Confirmed By: ABRAHAM LAU MD    Specimen Collected: 07/12/21 08:30          Neuro/Psych- negative ROS  GI/Hepatic/Renal/Endo    (+) obesity,  GERD well controlled,      Musculoskeletal     Abdominal   (+) obese,    Substance History - negative use     OB/GYN negative ob/gyn ROS         Other   arthritis (Gout),      ROS/Med Hx Other: Sodium 131                  Anesthesia Plan    ASA 3     general   (Discussed peripheral nerve block(popliteal) for post op pain relief and patient understands possible complications,risks and agrees.)  intravenous induction     Anesthetic plan, all risks, benefits, and alternatives have been provided, discussed and informed consent has been obtained with: patient.

## 2021-07-15 NOTE — ANESTHESIA POSTPROCEDURE EVALUATION
Patient: Matilde Lopez    Procedure Summary     Date: 07/15/21 Room / Location: [facility] OR  / [facility] OR    Anesthesia Start: 0715 Anesthesia Stop: 0757    Procedure: PLANTAR FASCIOTOMY FOOT (Right Foot) Diagnosis:       Plantar fasciitis      (Plantar fasciitis [M72.2])    Surgeons: Floyd Tuttle DPM Provider: Nakul Holland MD    Anesthesia Type: general ASA Status: 3          Anesthesia Type: general    Vitals  No vitals data found for the desired time range.          Post Anesthesia Care and Evaluation    Patient location during evaluation: PACU  Patient participation: complete - patient participated  Level of consciousness: awake and alert  Pain score: 0  Pain management: adequate  Airway patency: patent  Anesthetic complications: No anesthetic complications  PONV Status: none  Cardiovascular status: acceptable  Respiratory status: acceptable  Hydration status: acceptable

## 2021-07-19 ENCOUNTER — OFFICE VISIT (OUTPATIENT)
Dept: PODIATRY | Facility: CLINIC | Age: 36
End: 2021-07-19

## 2021-07-19 VITALS
HEIGHT: 65 IN | BODY MASS INDEX: 33.99 KG/M2 | HEART RATE: 90 BPM | OXYGEN SATURATION: 96 % | WEIGHT: 204 LBS | DIASTOLIC BLOOD PRESSURE: 88 MMHG | SYSTOLIC BLOOD PRESSURE: 130 MMHG

## 2021-07-19 DIAGNOSIS — M72.2 PLANTAR FASCIITIS: Primary | ICD-10-CM

## 2021-07-19 PROCEDURE — 99024 POSTOP FOLLOW-UP VISIT: CPT | Performed by: PODIATRIST

## 2021-07-19 RX ORDER — HYDROCODONE BITARTRATE AND ACETAMINOPHEN 7.5; 325 MG/1; MG/1
1 TABLET ORAL EVERY 6 HOURS PRN
Qty: 30 TABLET | Refills: 0 | Status: SHIPPED | OUTPATIENT
Start: 2021-07-19 | End: 2021-08-09

## 2021-07-19 NOTE — PROGRESS NOTES
Matilde Lopez  1985  36 y.o. female   PCP: Bess Downey APRN: - 2021  BS - 97 per patient     Post-op right foot.  2021      Chief Complaint   Patient presents with   • Right Foot - Follow-up, Post-op       History of Present Illness    Matilde Lopez is a 36 y.o.female who presents for follow-up of right foot plantar fasciotomy with Dr. Tuttle.  Date of surgery 7/15/2021.  She is doing well overall with expected postoperative swelling and pain.  Does note some increased nausea with her current pain medication.    Past Medical History:   Diagnosis Date   • Anemia    • Anxiety    • Asthma    • Diabetes (CMS/HCC)    • GERD (gastroesophageal reflux disease)    • Gout    • History of stomach ulcers    • Ingrown toenail    • Migraine    • Plantar fasciitis    • PONV (postoperative nausea and vomiting)          Past Surgical History:   Procedure Laterality Date   •  SECTION     • CHOLECYSTECTOMY     • HERNIA REPAIR     • HYSTERECTOMY     • PLANTAR FASCIA RELEASE Right 7/15/2021    Procedure: PLANTAR FASCIOTOMY FOOT;  Surgeon: Floyd Tuttle DPM;  Location: [facility];  Service: Podiatry;  Laterality: Right;   • SUBTOTAL HYSTERECTOMY           Family History   Problem Relation Age of Onset   • Heart disease Other    • Hypertension Other    • Diabetes Other    • Rashes / Skin problems Mother    • Heart disease Father    • Diabetes Father    • Hypertension Father        Allergies   Allergen Reactions   • Bee Venom Anaphylaxis   • Imitrex [Sumatriptan] Anaphylaxis   • Iodinated Diagnostic Agents Anaphylaxis       Social History     Socioeconomic History   • Marital status:      Spouse name: Not on file   • Number of children: Not on file   • Years of education: Not on file   • Highest education level: Not on file   Tobacco Use   • Smoking status: Never Smoker   • Smokeless tobacco: Never Used   Vaping Use   • Vaping Use: Never used   Substance and Sexual Activity   • Alcohol use: No   •  Drug use: No   • Sexual activity: Defer         Current Outpatient Medications   Medication Sig Dispense Refill   • albuterol sulfate  (90 Base) MCG/ACT inhaler Inhale 2 puffs Every 4 (Four) Hours As Needed for Wheezing.     • amitriptyline (ELAVIL) 10 MG tablet Take 10 mg by mouth Every Night.     • brompheniramine-pseudoephedrine-DM (Bromfed DM) 30-2-10 MG/5ML syrup Take 10 mL by mouth 4 (Four) Times a Day As Needed for Allergies.     • cetirizine (zyrTEC) 10 MG tablet Take 10 mg by mouth Every Night.     • clotrimazole-betamethasone (LOTRISONE) 1-0.05 % cream Apply  topically to the appropriate area as directed 2 (Two) Times a Day As Needed.     • Dexilant 60 MG capsule Take 1 capsule by mouth Every Night.     • EPINEPHrine (EPIPEN IJ) Inject  as directed As Needed.     • Ferrous Sulfate (IRON PO) Take  by mouth Daily.     • fluticasone (FLONASE) 50 MCG/ACT nasal spray 2 sprays into the nostril(s) as directed by provider Daily As Needed for Rhinitis.     • ibuprofen (ADVIL,MOTRIN) 800 MG tablet Take 800 mg by mouth Every 8 (Eight) Hours As Needed for Mild Pain .     • lubiprostone (AMITIZA) 8 MCG capsule Take 8 mcg by mouth 2 (Two) Times a Day With Meals.     • meloxicam (MOBIC) 15 MG tablet TAKE 1 TABLET BY MOUTH DAILY 30 tablet 0   • metoclopramide (REGLAN) 10 MG tablet Take 10 mg by mouth 4 (Four) Times a Day.     • Multiple Vitamin (MULTIVITAMIN PO) Take  by mouth Daily.     • ondansetron ODT (ZOFRAN-ODT) 4 MG disintegrating tablet Place 4 mg on the tongue Every 8 (Eight) Hours As Needed.     • OneTouch Ultra test strip 1 each by Other route Daily.     • promethazine (PHENERGAN) 25 MG tablet Take 1 tablet by mouth Every 6 (Six) Hours As Needed for Nausea or Vomiting. 15 tablet 0   • propranolol (INDERAL) 20 MG tablet Take 20 mg by mouth 3 (Three) Times a Day.     • terbinafine (LamISIL) 250 MG tablet Take 1 tablet by mouth Daily. 14 tablet 0   • VITAMIN D PO Take 1,000 Units by mouth Daily.     •  "HYDROcodone-acetaminophen (Norco) 7.5-325 MG per tablet Take 1 tablet by mouth Every 6 (Six) Hours As Needed for Moderate Pain . 30 tablet 0   • metFORMIN (GLUCOPHAGE) 500 MG tablet Take 500 mg by mouth Daily.       No current facility-administered medications for this visit.       Review of Systems   Constitutional: Negative.    HENT: Negative.    Eyes: Negative.    Respiratory: Negative.    Cardiovascular: Negative.    Gastrointestinal: Negative.    Musculoskeletal:        Heel pain     Skin: Negative.    Neurological: Negative.    Psychiatric/Behavioral: Negative.          OBJECTIVE    /88   Pulse 90   Ht 165.1 cm (65\")   Wt 92.5 kg (204 lb)   LMP  (LMP Unknown)   SpO2 96%   BMI 33.95 kg/m²       Physical Exam  Vitals reviewed.   Constitutional:       General: She is not in acute distress.     Appearance: She is well-developed.   HENT:      Head: Normocephalic and atraumatic.      Nose: Nose normal.   Eyes:      Conjunctiva/sclera: Conjunctivae normal.      Pupils: Pupils are equal, round, and reactive to light.   Cardiovascular:      Rate and Rhythm: Normal rate.      Pulses: Normal pulses.   Pulmonary:      Effort: Pulmonary effort is normal. No respiratory distress.      Breath sounds: No wheezing.   Musculoskeletal:         General: Tenderness present. No deformity. Normal range of motion.   Skin:     General: Skin is warm and dry.      Capillary Refill: Capillary refill takes less than 2 seconds.   Neurological:      Mental Status: She is alert and oriented to person, place, and time.   Psychiatric:         Behavior: Behavior normal.         Thought Content: Thought content normal.         Gait: Normal    Assistive Device: None    Lower Extremity:     Pulses palpable  Sensation intact  Can flex and extend all toes on right foot  Right plantar heel incision well approximated with sutures in place.  No signs of infection    Procedures        ASSESSMENT AND PLAN    Diagnoses and all orders for this " visit:    1. Plantar fasciitis (Primary)  -     HYDROcodone-acetaminophen (Norco) 7.5-325 MG per tablet; Take 1 tablet by mouth Every 6 (Six) Hours As Needed for Moderate Pain .  Dispense: 30 tablet; Refill: 0        -Patient doing well overall postoperatively  -Dressing change today.  Continue cam boot for all weightbearing  -Transition to Norco for pain control  -Check 1 week with Dr. Tuttle, possible suture removal          This document has been electronically signed by Telly Sandoval DPM on July 20, 2021 07:30 CDT

## 2021-07-26 ENCOUNTER — OFFICE VISIT (OUTPATIENT)
Dept: PODIATRY | Facility: CLINIC | Age: 36
End: 2021-07-26

## 2021-07-26 VITALS
OXYGEN SATURATION: 98 % | HEART RATE: 78 BPM | HEIGHT: 65 IN | WEIGHT: 204 LBS | DIASTOLIC BLOOD PRESSURE: 78 MMHG | SYSTOLIC BLOOD PRESSURE: 114 MMHG | BODY MASS INDEX: 33.99 KG/M2

## 2021-07-26 DIAGNOSIS — M72.2 PLANTAR FASCIITIS: Primary | ICD-10-CM

## 2021-07-26 PROCEDURE — 99024 POSTOP FOLLOW-UP VISIT: CPT | Performed by: PODIATRIST

## 2021-07-26 RX ORDER — FAMOTIDINE 40 MG/1
40 TABLET, FILM COATED ORAL DAILY
COMMUNITY
Start: 2021-06-24 | End: 2021-09-23

## 2021-07-26 RX ORDER — DEXLANSOPRAZOLE 60 MG/1
60 CAPSULE, DELAYED RELEASE ORAL DAILY
COMMUNITY
Start: 2021-06-24 | End: 2021-09-23

## 2021-07-26 RX ORDER — BLOOD SUGAR DIAGNOSTIC
STRIP MISCELLANEOUS
COMMUNITY
Start: 2021-06-28 | End: 2021-10-07

## 2021-07-26 NOTE — PROGRESS NOTES
Matilde Lopez  1985  36 y.o. female   PCP: Bess Downey APRN: - 2021  BS - 98 per patient     2021     Chief Complaint   Patient presents with   • Right Foot - Post-op, Follow-up       History of Present Illness    Matilde Lopez is a 36 y.o.female who presents for her second postoperative visit.  Patient had plantar fasciotomy right foot July 15, 2021.    Past Medical History:   Diagnosis Date   • Anemia    • Anxiety    • Asthma    • Diabetes (CMS/HCC)    • GERD (gastroesophageal reflux disease)    • Gout    • History of stomach ulcers    • Ingrown toenail    • Migraine    • Plantar fasciitis    • PONV (postoperative nausea and vomiting)          Past Surgical History:   Procedure Laterality Date   •  SECTION     • CHOLECYSTECTOMY     • HERNIA REPAIR     • HYSTERECTOMY     • PLANTAR FASCIA RELEASE Right 7/15/2021    Procedure: PLANTAR FASCIOTOMY FOOT;  Surgeon: Floyd Tuttle DPM;  Location: [facility];  Service: Podiatry;  Laterality: Right;   • SUBTOTAL HYSTERECTOMY           Family History   Problem Relation Age of Onset   • Heart disease Other    • Hypertension Other    • Diabetes Other    • Rashes / Skin problems Mother    • Heart disease Father    • Diabetes Father    • Hypertension Father        Allergies   Allergen Reactions   • Bee Venom Anaphylaxis   • Imitrex [Sumatriptan] Anaphylaxis   • Iodinated Diagnostic Agents Anaphylaxis   • Nsaids Anaphylaxis   • Topiramate Rash       Social History     Socioeconomic History   • Marital status:      Spouse name: Not on file   • Number of children: Not on file   • Years of education: Not on file   • Highest education level: Not on file   Tobacco Use   • Smoking status: Never Smoker   • Smokeless tobacco: Never Used   Vaping Use   • Vaping Use: Never used   Substance and Sexual Activity   • Alcohol use: No   • Drug use: No   • Sexual activity: Defer         Current Outpatient Medications   Medication Sig Dispense Refill   •  albuterol sulfate  (90 Base) MCG/ACT inhaler Inhale 2 puffs Every 4 (Four) Hours As Needed for Wheezing.     • amitriptyline (ELAVIL) 10 MG tablet Take 10 mg by mouth Every Night.     • brompheniramine-pseudoephedrine-DM (Bromfed DM) 30-2-10 MG/5ML syrup Take 10 mL by mouth 4 (Four) Times a Day As Needed for Allergies.     • cetirizine (zyrTEC) 10 MG tablet Take 10 mg by mouth Every Night.     • clotrimazole-betamethasone (LOTRISONE) 1-0.05 % cream Apply  topically to the appropriate area as directed 2 (Two) Times a Day As Needed.     • dexlansoprazole (DEXILANT) 60 MG capsule Take 60 mg by mouth Daily.     • EPINEPHrine (EPIPEN IJ) Inject  as directed As Needed.     • famotidine (PEPCID) 40 MG tablet Take 40 mg by mouth Daily.     • Ferrous Sulfate (IRON PO) Take  by mouth Daily.     • fluticasone (FLONASE) 50 MCG/ACT nasal spray 2 sprays into the nostril(s) as directed by provider Daily As Needed for Rhinitis.     • glucose blood (OneTouch Ultra) test strip USE TO TEST BLOOD SUGAR EVERY DAY     • ibuprofen (ADVIL,MOTRIN) 800 MG tablet Take 800 mg by mouth Every 8 (Eight) Hours As Needed for Mild Pain .     • lubiprostone (AMITIZA) 8 MCG capsule Take 8 mcg by mouth 2 (Two) Times a Day With Meals.     • meloxicam (MOBIC) 15 MG tablet TAKE 1 TABLET BY MOUTH DAILY 30 tablet 0   • metoclopramide (REGLAN) 10 MG tablet Take 10 mg by mouth 4 (Four) Times a Day.     • Multiple Vitamin (MULTIVITAMIN PO) Take  by mouth Daily.     • ondansetron ODT (ZOFRAN-ODT) 4 MG disintegrating tablet Place 4 mg on the tongue Every 8 (Eight) Hours As Needed.     • OneTouch Ultra test strip 1 each by Other route Daily.     • promethazine (PHENERGAN) 25 MG tablet Take 1 tablet by mouth Every 6 (Six) Hours As Needed for Nausea or Vomiting. 15 tablet 0   • propranolol (INDERAL) 20 MG tablet Take 20 mg by mouth 3 (Three) Times a Day.     • terbinafine (LamISIL) 250 MG tablet Take 1 tablet by mouth Daily. 14 tablet 0   • VITAMIN D PO Take  "1,000 Units by mouth Daily.     • HYDROcodone-acetaminophen (Norco) 7.5-325 MG per tablet Take 1 tablet by mouth Every 6 (Six) Hours As Needed for Moderate Pain . 30 tablet 0   • metFORMIN (GLUCOPHAGE) 500 MG tablet Take 500 mg by mouth Daily.       No current facility-administered medications for this visit.       Review of Systems   Constitutional: Negative.    HENT: Negative.    Eyes: Negative.    Respiratory: Negative.    Cardiovascular: Negative.    Gastrointestinal: Negative.    Neurological: Negative.    Psychiatric/Behavioral: Negative.          OBJECTIVE    /78   Pulse 78   Ht 165.1 cm (65\")   Wt 92.5 kg (204 lb)   LMP  (LMP Unknown)   SpO2 98%   BMI 33.95 kg/m²       Physical Exam  Vitals reviewed.   Constitutional:       General: She is not in acute distress.     Appearance: She is well-developed.   HENT:      Head: Normocephalic and atraumatic.      Nose: Nose normal.   Eyes:      Conjunctiva/sclera: Conjunctivae normal.      Pupils: Pupils are equal, round, and reactive to light.   Cardiovascular:      Rate and Rhythm: Normal rate.      Pulses: Normal pulses.   Pulmonary:      Effort: Pulmonary effort is normal. No respiratory distress.      Breath sounds: No wheezing.   Musculoskeletal:         General: No deformity. Normal range of motion.   Skin:     General: Skin is warm and dry.      Capillary Refill: Capillary refill takes less than 2 seconds.   Neurological:      Mental Status: She is alert and oriented to person, place, and time.   Psychiatric:         Behavior: Behavior normal.         Thought Content: Thought content normal.         Gait: Normal    Assistive Device: None    Right Lower Extremity: Incision site well approximated.  No signs of dehiscence.  Minimal edema.  No ecchymosis.  Sensation intact light touch.  CFT immediate to distal digits.    Procedures        ASSESSMENT AND PLAN    Diagnoses and all orders for this visit:    1. Plantar fasciitis (Primary)        -Patient " doing well postoperatively.  Sutures removed.  Continue weightbearing in cam boot.  Recheck 2 weeks          This document has been electronically signed by Floyd Tuttle DPM on July 26, 2021 13:49 CDT

## 2021-08-09 ENCOUNTER — OFFICE VISIT (OUTPATIENT)
Dept: PODIATRY | Facility: CLINIC | Age: 36
End: 2021-08-09

## 2021-08-09 VITALS
BODY MASS INDEX: 33.99 KG/M2 | WEIGHT: 204 LBS | DIASTOLIC BLOOD PRESSURE: 78 MMHG | HEART RATE: 69 BPM | HEIGHT: 65 IN | SYSTOLIC BLOOD PRESSURE: 116 MMHG | OXYGEN SATURATION: 97 %

## 2021-08-09 DIAGNOSIS — M72.2 PLANTAR FASCIITIS: Primary | ICD-10-CM

## 2021-08-09 PROCEDURE — 99024 POSTOP FOLLOW-UP VISIT: CPT | Performed by: PODIATRIST

## 2021-08-09 NOTE — PROGRESS NOTES
Matilde Lopez  1985  36 y.o. female   PCP: Bess Downey APRN: - 2021  BS - 98 per patient     2021     Chief Complaint   Patient presents with   • Right Foot - Post-op Follow-up       History of Present Illness    Matilde Lopez is a 36 y.o.female who presents for her third postoperative visit.  Patient had plantar fasciotomy right foot July 15, 2021.    Past Medical History:   Diagnosis Date   • Anemia    • Anxiety    • Asthma    • Diabetes (CMS/HCC)    • GERD (gastroesophageal reflux disease)    • Gout    • History of stomach ulcers    • Ingrown toenail    • Migraine    • Plantar fasciitis    • PONV (postoperative nausea and vomiting)          Past Surgical History:   Procedure Laterality Date   •  SECTION     • CHOLECYSTECTOMY     • HERNIA REPAIR     • HYSTERECTOMY     • PLANTAR FASCIA RELEASE Right 7/15/2021    Procedure: PLANTAR FASCIOTOMY FOOT;  Surgeon: Floyd Tuttle DPM;  Location: [facility];  Service: Podiatry;  Laterality: Right;   • SUBTOTAL HYSTERECTOMY           Family History   Problem Relation Age of Onset   • Heart disease Other    • Hypertension Other    • Diabetes Other    • Rashes / Skin problems Mother    • Heart disease Father    • Diabetes Father    • Hypertension Father        Allergies   Allergen Reactions   • Bee Venom Anaphylaxis   • Imitrex [Sumatriptan] Anaphylaxis   • Iodinated Diagnostic Agents Anaphylaxis   • Nsaids Anaphylaxis   • Topiramate Rash       Social History     Socioeconomic History   • Marital status:      Spouse name: Not on file   • Number of children: Not on file   • Years of education: Not on file   • Highest education level: Not on file   Tobacco Use   • Smoking status: Never Smoker   • Smokeless tobacco: Never Used   Vaping Use   • Vaping Use: Never used   Substance and Sexual Activity   • Alcohol use: No   • Drug use: No   • Sexual activity: Defer         Current Outpatient Medications   Medication Sig Dispense Refill   •  albuterol sulfate  (90 Base) MCG/ACT inhaler Inhale 2 puffs Every 4 (Four) Hours As Needed for Wheezing.     • amitriptyline (ELAVIL) 10 MG tablet Take 10 mg by mouth Every Night.     • brompheniramine-pseudoephedrine-DM (Bromfed DM) 30-2-10 MG/5ML syrup Take 10 mL by mouth 4 (Four) Times a Day As Needed for Allergies.     • cetirizine (zyrTEC) 10 MG tablet Take 10 mg by mouth Every Night.     • clotrimazole-betamethasone (LOTRISONE) 1-0.05 % cream Apply  topically to the appropriate area as directed 2 (Two) Times a Day As Needed.     • dexlansoprazole (DEXILANT) 60 MG capsule Take 60 mg by mouth Daily.     • EPINEPHrine (EPIPEN IJ) Inject  as directed As Needed.     • famotidine (PEPCID) 40 MG tablet Take 40 mg by mouth Daily.     • Ferrous Sulfate (IRON PO) Take  by mouth Daily.     • fluticasone (FLONASE) 50 MCG/ACT nasal spray 2 sprays into the nostril(s) as directed by provider Daily As Needed for Rhinitis.     • glucose blood (OneTouch Ultra) test strip USE TO TEST BLOOD SUGAR EVERY DAY     • ibuprofen (ADVIL,MOTRIN) 800 MG tablet Take 800 mg by mouth Every 8 (Eight) Hours As Needed for Mild Pain .     • lubiprostone (AMITIZA) 8 MCG capsule Take 8 mcg by mouth 2 (Two) Times a Day With Meals.     • meloxicam (MOBIC) 15 MG tablet TAKE 1 TABLET BY MOUTH DAILY 30 tablet 0   • metoclopramide (REGLAN) 10 MG tablet Take 10 mg by mouth 4 (Four) Times a Day.     • Multiple Vitamin (MULTIVITAMIN PO) Take  by mouth Daily.     • ondansetron ODT (ZOFRAN-ODT) 4 MG disintegrating tablet Place 4 mg on the tongue Every 8 (Eight) Hours As Needed.     • OneTouch Ultra test strip 1 each by Other route Daily.     • promethazine (PHENERGAN) 25 MG tablet Take 1 tablet by mouth Every 6 (Six) Hours As Needed for Nausea or Vomiting. 15 tablet 0   • propranolol (INDERAL) 20 MG tablet Take 20 mg by mouth 3 (Three) Times a Day.     • terbinafine (LamISIL) 250 MG tablet Take 1 tablet by mouth Daily. 14 tablet 0   • VITAMIN D PO Take  "1,000 Units by mouth Daily.     • metFORMIN (GLUCOPHAGE) 500 MG tablet Take 500 mg by mouth Daily.       No current facility-administered medications for this visit.       Review of Systems   Constitutional: Negative.    HENT: Negative.    Eyes: Negative.    Respiratory: Negative.    Cardiovascular: Negative.    Gastrointestinal: Negative.    Neurological: Negative.    Psychiatric/Behavioral: Negative.          OBJECTIVE    /78   Pulse 69   Ht 165.1 cm (65\")   Wt 92.5 kg (204 lb)   LMP  (LMP Unknown)   SpO2 97%   BMI 33.95 kg/m²       Physical Exam  Vitals reviewed.   Constitutional:       General: She is not in acute distress.     Appearance: She is well-developed.   HENT:      Head: Normocephalic and atraumatic.      Nose: Nose normal.   Eyes:      Conjunctiva/sclera: Conjunctivae normal.      Pupils: Pupils are equal, round, and reactive to light.   Cardiovascular:      Rate and Rhythm: Normal rate.      Pulses: Normal pulses.   Pulmonary:      Effort: Pulmonary effort is normal. No respiratory distress.      Breath sounds: No wheezing.   Musculoskeletal:         General: No deformity. Normal range of motion.   Skin:     General: Skin is warm and dry.      Capillary Refill: Capillary refill takes less than 2 seconds.   Neurological:      Mental Status: She is alert and oriented to person, place, and time.   Psychiatric:         Behavior: Behavior normal.         Thought Content: Thought content normal.         Gait: Normal    Assistive Device: None    Right Lower Extremity: Incision site healed. No signs of dehiscence.  No edema.  No ecchymosis.  Sensation intact light touch.  CFT immediate to distal digits.    Procedures        ASSESSMENT AND PLAN    Diagnoses and all orders for this visit:    1. Plantar fasciitis (Primary)        -Patient doing well postoperatively.  Transition to athletic shoe gear with OTC arch supports.  Progress activity as tolerated.  Recheck 4 weeks.             This document " has been electronically signed by Floyd Tuttle DPM on August 9, 2021 09:06 CDT

## 2021-09-15 ENCOUNTER — OFFICE VISIT (OUTPATIENT)
Dept: PODIATRY | Facility: CLINIC | Age: 36
End: 2021-09-15

## 2021-09-15 VITALS
BODY MASS INDEX: 33.99 KG/M2 | DIASTOLIC BLOOD PRESSURE: 79 MMHG | SYSTOLIC BLOOD PRESSURE: 111 MMHG | HEART RATE: 71 BPM | HEIGHT: 65 IN | WEIGHT: 204 LBS | OXYGEN SATURATION: 91 %

## 2021-09-15 DIAGNOSIS — M72.2 PLANTAR FASCIITIS: Primary | ICD-10-CM

## 2021-09-15 PROCEDURE — 99024 POSTOP FOLLOW-UP VISIT: CPT | Performed by: PODIATRIST

## 2021-09-15 NOTE — PROGRESS NOTES
Discharge Date: 9/7/2019  Discharge disposition: Home    Discharge Medications:none    Pamphlets/ Instructions given to and reviewed with patient.    Activity: As tolerated    Diet: General Diet, no restrictions    Warning signs of labor:   · Sudden and/or constant pain  · Vaginal bleeding  · Sudden gush or leak of fluid from vagina  · Fainting  · Severe nausea and/or vomiting  · Blurry vision or spots/ stars before eyes  · Pain or burning with urination  · Chills and/or fever  · Your baby moves less than usual  · Increase and/or change in vaginal discharge    Appointments: Contact office to make appointment an appointment for this week      Referrals: None    Reviewed with patient by: Pili Marsh RN  9/7/2019         Matilde Lopez  1985  36 y.o. female   PCP: Bess Downey APRN: - 2021  BS - 97 per patient     Postop follow up right foot plantar fasc.    09/15/2021     Chief Complaint   Patient presents with   • Right Foot - Follow-up       History of Present Illness    Matilde Lopez is a 36 y.o.female who presents for her fourth postoperative visit.  Patient had plantar fasciotomy right foot July 15, 2021.    Past Medical History:   Diagnosis Date   • Anemia    • Anxiety    • Asthma    • Diabetes (CMS/HCC)    • GERD (gastroesophageal reflux disease)    • Gout    • History of stomach ulcers    • Ingrown toenail    • Migraine    • Plantar fasciitis    • PONV (postoperative nausea and vomiting)          Past Surgical History:   Procedure Laterality Date   •  SECTION     • CHOLECYSTECTOMY     • HERNIA REPAIR     • HYSTERECTOMY     • PLANTAR FASCIA RELEASE Right 7/15/2021    Procedure: PLANTAR FASCIOTOMY FOOT;  Surgeon: Floyd Tuttle DPM;  Location: [facility];  Service: Podiatry;  Laterality: Right;   • SUBTOTAL HYSTERECTOMY           Family History   Problem Relation Age of Onset   • Heart disease Other    • Hypertension Other    • Diabetes Other    • Rashes / Skin problems Mother    • Heart disease Father    • Diabetes Father    • Hypertension Father        Allergies   Allergen Reactions   • Bee Venom Anaphylaxis   • Imitrex [Sumatriptan] Anaphylaxis   • Iodinated Diagnostic Agents Anaphylaxis   • Nsaids Anaphylaxis   • Topiramate Rash       Social History     Socioeconomic History   • Marital status:      Spouse name: Not on file   • Number of children: Not on file   • Years of education: Not on file   • Highest education level: Not on file   Tobacco Use   • Smoking status: Never Smoker   • Smokeless tobacco: Never Used   Vaping Use   • Vaping Use: Never used   Substance and Sexual Activity   • Alcohol use: No   • Drug use: No   • Sexual activity: Defer         Current Outpatient Medications    Medication Sig Dispense Refill   • albuterol sulfate  (90 Base) MCG/ACT inhaler Inhale 2 puffs Every 4 (Four) Hours As Needed for Wheezing.     • amitriptyline (ELAVIL) 10 MG tablet Take 10 mg by mouth Every Night.     • brompheniramine-pseudoephedrine-DM (Bromfed DM) 30-2-10 MG/5ML syrup Take 10 mL by mouth 4 (Four) Times a Day As Needed for Allergies.     • cetirizine (zyrTEC) 10 MG tablet Take 10 mg by mouth Every Night.     • clotrimazole-betamethasone (LOTRISONE) 1-0.05 % cream Apply  topically to the appropriate area as directed 2 (Two) Times a Day As Needed.     • dexlansoprazole (DEXILANT) 60 MG capsule Take 60 mg by mouth Daily.     • EPINEPHrine (EPIPEN IJ) Inject  as directed As Needed.     • famotidine (PEPCID) 40 MG tablet Take 40 mg by mouth Daily.     • Ferrous Sulfate (IRON PO) Take  by mouth Daily.     • fluticasone (FLONASE) 50 MCG/ACT nasal spray 2 sprays into the nostril(s) as directed by provider Daily As Needed for Rhinitis.     • glucose blood (OneTouch Ultra) test strip USE TO TEST BLOOD SUGAR EVERY DAY     • ibuprofen (ADVIL,MOTRIN) 800 MG tablet Take 800 mg by mouth Every 8 (Eight) Hours As Needed for Mild Pain .     • lubiprostone (AMITIZA) 8 MCG capsule Take 8 mcg by mouth 2 (Two) Times a Day With Meals.     • meloxicam (MOBIC) 15 MG tablet TAKE 1 TABLET BY MOUTH DAILY 30 tablet 0   • metoclopramide (REGLAN) 10 MG tablet Take 10 mg by mouth 4 (Four) Times a Day.     • Multiple Vitamin (MULTIVITAMIN PO) Take  by mouth Daily.     • ondansetron ODT (ZOFRAN-ODT) 4 MG disintegrating tablet Place 4 mg on the tongue Every 8 (Eight) Hours As Needed.     • OneTouch Ultra test strip 1 each by Other route Daily.     • promethazine (PHENERGAN) 25 MG tablet Take 1 tablet by mouth Every 6 (Six) Hours As Needed for Nausea or Vomiting. 15 tablet 0   • propranolol (INDERAL) 20 MG tablet Take 20 mg by mouth 3 (Three) Times a Day.     • terbinafine (LamISIL) 250 MG tablet Take 1 tablet by mouth  "Daily. 14 tablet 0   • VITAMIN D PO Take 1,000 Units by mouth Daily.     • metFORMIN (GLUCOPHAGE) 500 MG tablet Take 500 mg by mouth Daily.       No current facility-administered medications for this visit.       Review of Systems   Constitutional: Negative.    HENT: Negative.    Eyes: Negative.    Respiratory: Negative.    Cardiovascular: Negative.    Gastrointestinal: Negative.    Neurological: Negative.    Psychiatric/Behavioral: Negative.          OBJECTIVE    /79   Pulse 71   Ht 165.1 cm (65\")   Wt 92.5 kg (204 lb)   LMP  (LMP Unknown)   SpO2 91%   BMI 33.95 kg/m²       Physical Exam  Vitals reviewed.   Constitutional:       General: She is not in acute distress.     Appearance: She is well-developed.   HENT:      Head: Normocephalic and atraumatic.      Nose: Nose normal.   Eyes:      Conjunctiva/sclera: Conjunctivae normal.      Pupils: Pupils are equal, round, and reactive to light.   Cardiovascular:      Rate and Rhythm: Normal rate.      Pulses: Normal pulses.   Pulmonary:      Effort: Pulmonary effort is normal. No respiratory distress.      Breath sounds: No wheezing.   Musculoskeletal:         General: No deformity. Normal range of motion.   Skin:     General: Skin is warm and dry.      Capillary Refill: Capillary refill takes less than 2 seconds.   Neurological:      Mental Status: She is alert and oriented to person, place, and time.   Psychiatric:         Behavior: Behavior normal.         Thought Content: Thought content normal.         Gait: Normal    Assistive Device: None    Right Lower Extremity: Incision site healed. No signs of dehiscence.  No edema.  No ecchymosis.  Sensation intact light touch.  CFT immediate to distal digits.    Procedures        ASSESSMENT AND PLAN    Diagnoses and all orders for this visit:    1. Plantar fasciitis (Primary)        -Patient doing well postoperatively.  Progress activity as tolerated.  Recheck as needed          This document has been " electronically signed by Floyd Tuttle DPM on September 15, 2021 10:55 CDT

## 2022-07-27 ENCOUNTER — OFFICE VISIT (OUTPATIENT)
Dept: PODIATRY | Facility: CLINIC | Age: 37
End: 2022-07-27

## 2022-07-27 VITALS — WEIGHT: 204 LBS | HEART RATE: 60 BPM | OXYGEN SATURATION: 98 % | HEIGHT: 65 IN | BODY MASS INDEX: 33.99 KG/M2

## 2022-07-27 DIAGNOSIS — M79.672 LEFT FOOT PAIN: Primary | ICD-10-CM

## 2022-07-27 DIAGNOSIS — M72.2 PLANTAR FASCIITIS: ICD-10-CM

## 2022-07-27 PROCEDURE — 99214 OFFICE O/P EST MOD 30 MIN: CPT | Performed by: PODIATRIST

## 2022-07-27 NOTE — PROGRESS NOTES
Matilde Lopez  1985  37 y.o. female   PCP: Rosenda Rocha: 2022  BS - 117 per patient       2022    Chief Complaint   Patient presents with   • Left Foot - Pain       History of Present Illness    Matilde Lopez is a 37 y.o.female who presents to the clinic today wanting to discuss surgery on her left foot. Patient states her foot has been painful for about 3 months. No known injury.     Past Medical History:   Diagnosis Date   • Anemia    • Anxiety    • Asthma    • Diabetes (HCC)    • GERD (gastroesophageal reflux disease)    • Gout    • History of stomach ulcers    • Ingrown toenail    • Migraine    • Plantar fasciitis    • PONV (postoperative nausea and vomiting)          Past Surgical History:   Procedure Laterality Date   •  SECTION     • CHOLECYSTECTOMY     • HERNIA REPAIR     • HYSTERECTOMY     • PLANTAR FASCIA RELEASE Right 7/15/2021    Procedure: PLANTAR FASCIOTOMY FOOT;  Surgeon: Floyd Tuttle DPM;  Location: [facility];  Service: Podiatry;  Laterality: Right;   • SUBTOTAL HYSTERECTOMY           Family History   Problem Relation Age of Onset   • Heart disease Other    • Hypertension Other    • Diabetes Other    • Rashes / Skin problems Mother    • Heart disease Father    • Diabetes Father    • Hypertension Father        Allergies   Allergen Reactions   • Bee Venom Anaphylaxis   • Imitrex [Sumatriptan] Anaphylaxis   • Iodinated Diagnostic Agents Anaphylaxis   • Nsaids Anaphylaxis   • Topiramate Rash       Social History     Socioeconomic History   • Marital status:    Tobacco Use   • Smoking status: Never Smoker   • Smokeless tobacco: Never Used   Vaping Use   • Vaping Use: Never used   Substance and Sexual Activity   • Alcohol use: No   • Drug use: No   • Sexual activity: Defer         Current Outpatient Medications   Medication Sig Dispense Refill   • albuterol sulfate  (90 Base) MCG/ACT inhaler Inhale 2 puffs Every 4 (Four) Hours As Needed for Wheezing.     •  "amitriptyline (ELAVIL) 10 MG tablet Take 10 mg by mouth Every Night.     • brompheniramine-pseudoephedrine-DM 30-2-10 MG/5ML syrup Take 10 mL by mouth 4 (Four) Times a Day As Needed for Allergies.     • cetirizine (zyrTEC) 10 MG tablet Take 10 mg by mouth Every Night.     • clotrimazole-betamethasone (LOTRISONE) 1-0.05 % cream Apply  topically to the appropriate area as directed 2 (Two) Times a Day As Needed.     • EPINEPHrine (EPIPEN IJ) Inject  as directed As Needed.     • Ferrous Sulfate (IRON PO) Take  by mouth Daily.     • fluticasone (FLONASE) 50 MCG/ACT nasal spray 2 sprays into the nostril(s) as directed by provider Daily As Needed for Rhinitis.     • ibuprofen (ADVIL,MOTRIN) 800 MG tablet Take 800 mg by mouth Every 8 (Eight) Hours As Needed for Mild Pain .     • lubiprostone (AMITIZA) 8 MCG capsule Take 8 mcg by mouth 2 (Two) Times a Day With Meals.     • metFORMIN (GLUCOPHAGE) 500 MG tablet Take 500 mg by mouth 2 (Two) Times a Day With Meals.     • metoclopramide (REGLAN) 10 MG tablet Take 10 mg by mouth 4 (Four) Times a Day.     • Multiple Vitamin (MULTIVITAMIN PO) Take  by mouth Daily.     • ondansetron ODT (ZOFRAN-ODT) 4 MG disintegrating tablet Place 4 mg on the tongue Every 8 (Eight) Hours As Needed.     • OneTouch Ultra test strip 1 each by Other route Daily.     • promethazine (PHENERGAN) 25 MG tablet Take 1 tablet by mouth Every 6 (Six) Hours As Needed for Nausea or Vomiting. 15 tablet 0   • propranolol (INDERAL) 20 MG tablet Take 20 mg by mouth 3 (Three) Times a Day.     • VITAMIN D PO Take 1,000 Units by mouth Daily.       No current facility-administered medications for this visit.       Review of Systems   Constitutional: Negative.    HENT: Negative.    Eyes: Negative.    Respiratory: Negative.    Cardiovascular: Negative.    Gastrointestinal: Negative.    Neurological: Negative.    Psychiatric/Behavioral: Negative.          OBJECTIVE    Pulse 60   Ht 165.1 cm (65\")   Wt 92.5 kg (204 lb)   " LMP  (LMP Unknown)   SpO2 98%   BMI 33.95 kg/m²       Physical Exam  Vitals reviewed.   Constitutional:       General: She is not in acute distress.     Appearance: She is well-developed.   HENT:      Head: Normocephalic and atraumatic.      Nose: Nose normal.   Eyes:      Conjunctiva/sclera: Conjunctivae normal.      Pupils: Pupils are equal, round, and reactive to light.   Cardiovascular:      Rate and Rhythm: Normal rate.      Pulses: Normal pulses.           Dorsalis pedis pulses are 2+ on the left side.        Posterior tibial pulses are 2+ on the left side.   Pulmonary:      Effort: Pulmonary effort is normal. No respiratory distress.      Breath sounds: No wheezing.   Musculoskeletal:         General: No deformity. Normal range of motion.        Feet:    Feet:      Left foot:      Skin integrity: Skin integrity normal.   Skin:     General: Skin is warm and dry.      Capillary Refill: Capillary refill takes less than 2 seconds.   Neurological:      Mental Status: She is alert and oriented to person, place, and time.   Psychiatric:         Behavior: Behavior normal.         Thought Content: Thought content normal.           Procedures        ASSESSMENT AND PLAN    Diagnoses and all orders for this visit:    1. Left foot pain (Primary)    2. Plantar fasciitis  -     Case Request; Standing  -     Case Request    Other orders  -     Follow Anesthesia Guidelines / Standing Orders; Future        -Patient examined and evaluated.  -Conservative and surgical treatment options discussed.  Patient elected for surgical invention.  Patient did very well with plantar fasciotomy on the right foot.  -Surgical plan is plantar fasciotomy left foot  -Risks and benefits of the procedure including but not limited to postoperative infection, incisional dehiscence, numbness, swelling, residual pain and postoperative blood clot discussed in detail.  No guarantees were given or implied.  -Tentative date for the surgery August 11,  2022  -All questions were answered  -Recheck following surgery           This document has been electronically signed by Floyd Tuttle DPM on July 29, 2022 10:14 CDT

## 2022-07-29 RX ORDER — BUPIVACAINE HCL/0.9 % NACL/PF 0.1 %
2 PLASTIC BAG, INJECTION (ML) EPIDURAL ONCE
Status: CANCELLED | OUTPATIENT
Start: 2022-08-11 | End: 2022-07-29

## 2022-08-08 ENCOUNTER — PRE-ADMISSION TESTING (OUTPATIENT)
Dept: PREADMISSION TESTING | Facility: HOSPITAL | Age: 37
End: 2022-08-08

## 2022-08-08 VITALS
BODY MASS INDEX: 35.65 KG/M2 | HEIGHT: 65 IN | RESPIRATION RATE: 16 BRPM | HEART RATE: 60 BPM | OXYGEN SATURATION: 98 % | DIASTOLIC BLOOD PRESSURE: 70 MMHG | SYSTOLIC BLOOD PRESSURE: 110 MMHG | WEIGHT: 214 LBS

## 2022-08-08 LAB
ANION GAP SERPL CALCULATED.3IONS-SCNC: 6 MMOL/L (ref 5–15)
BUN SERPL-MCNC: 12 MG/DL (ref 6–20)
BUN/CREAT SERPL: 15.2 (ref 7–25)
CALCIUM SPEC-SCNC: 9.1 MG/DL (ref 8.6–10.5)
CHLORIDE SERPL-SCNC: 104 MMOL/L (ref 98–107)
CO2 SERPL-SCNC: 28 MMOL/L (ref 22–29)
CREAT SERPL-MCNC: 0.79 MG/DL (ref 0.57–1)
EGFRCR SERPLBLD CKD-EPI 2021: 98.3 ML/MIN/1.73
GLUCOSE SERPL-MCNC: 102 MG/DL (ref 65–99)
POTASSIUM SERPL-SCNC: 3.7 MMOL/L (ref 3.5–5.2)
SODIUM SERPL-SCNC: 138 MMOL/L (ref 136–145)

## 2022-08-08 PROCEDURE — 93010 ELECTROCARDIOGRAM REPORT: CPT | Performed by: INTERNAL MEDICINE

## 2022-08-08 PROCEDURE — 80048 BASIC METABOLIC PNL TOTAL CA: CPT

## 2022-08-08 PROCEDURE — 36415 COLL VENOUS BLD VENIPUNCTURE: CPT

## 2022-08-08 PROCEDURE — 93005 ELECTROCARDIOGRAM TRACING: CPT

## 2022-08-08 RX ORDER — SODIUM CHLORIDE 9 MG/ML
1000 INJECTION, SOLUTION INTRAVENOUS CONTINUOUS
Status: CANCELLED | OUTPATIENT
Start: 2022-08-11

## 2022-08-08 RX ORDER — FAMOTIDINE 20 MG/1
20 TABLET, FILM COATED ORAL 2 TIMES DAILY
COMMUNITY

## 2022-08-08 RX ORDER — OMEPRAZOLE 40 MG/1
40 CAPSULE, DELAYED RELEASE ORAL 2 TIMES DAILY
COMMUNITY

## 2022-08-11 ENCOUNTER — ANESTHESIA EVENT (OUTPATIENT)
Dept: PERIOP | Facility: HOSPITAL | Age: 37
End: 2022-08-11

## 2022-08-11 ENCOUNTER — ANESTHESIA (OUTPATIENT)
Dept: PERIOP | Facility: HOSPITAL | Age: 37
End: 2022-08-11

## 2022-08-11 ENCOUNTER — HOSPITAL ENCOUNTER (OUTPATIENT)
Facility: HOSPITAL | Age: 37
Setting detail: HOSPITAL OUTPATIENT SURGERY
Discharge: HOME | End: 2022-08-11
Attending: PODIATRIST | Admitting: PODIATRIST

## 2022-08-11 VITALS
BODY MASS INDEX: 35.41 KG/M2 | RESPIRATION RATE: 18 BRPM | TEMPERATURE: 97.7 F | WEIGHT: 212.52 LBS | SYSTOLIC BLOOD PRESSURE: 134 MMHG | HEART RATE: 58 BPM | DIASTOLIC BLOOD PRESSURE: 72 MMHG | OXYGEN SATURATION: 100 % | HEIGHT: 65 IN

## 2022-08-11 DIAGNOSIS — M72.2 PLANTAR FASCIITIS: ICD-10-CM

## 2022-08-11 LAB
GLUCOSE BLDC GLUCOMTR-MCNC: 101 MG/DL (ref 70–130)
GLUCOSE BLDC GLUCOMTR-MCNC: 101 MG/DL (ref 70–130)

## 2022-08-11 PROCEDURE — 25010000002 CEFAZOLIN PER 500 MG: Performed by: PODIATRIST

## 2022-08-11 PROCEDURE — 82962 GLUCOSE BLOOD TEST: CPT

## 2022-08-11 PROCEDURE — 25010000002 FENTANYL CITRATE (PF) 50 MCG/ML SOLUTION

## 2022-08-11 PROCEDURE — 25010000002 PROPOFOL 10 MG/ML EMULSION

## 2022-08-11 PROCEDURE — 25010000002 MIDAZOLAM PER 1 MG

## 2022-08-11 PROCEDURE — 25010000002 ROPIVACAINE PER 1 MG: Performed by: ANESTHESIOLOGY

## 2022-08-11 PROCEDURE — 28060 PARTIAL REMOVAL FOOT FASCIA: CPT | Performed by: PODIATRIST

## 2022-08-11 PROCEDURE — 25010000002 DEXAMETHASONE PER 1 MG

## 2022-08-11 PROCEDURE — 25010000002 ONDANSETRON PER 1 MG

## 2022-08-11 PROCEDURE — 25010000002 DEXAMETHASONE PER 1 MG: Performed by: PODIATRIST

## 2022-08-11 RX ORDER — FENTANYL CITRATE 50 UG/ML
INJECTION, SOLUTION INTRAMUSCULAR; INTRAVENOUS AS NEEDED
Status: DISCONTINUED | OUTPATIENT
Start: 2022-08-11 | End: 2022-08-11 | Stop reason: SURG

## 2022-08-11 RX ORDER — PROMETHAZINE HYDROCHLORIDE 25 MG/1
25 SUPPOSITORY RECTAL ONCE AS NEEDED
Status: DISCONTINUED | OUTPATIENT
Start: 2022-08-11 | End: 2022-08-11 | Stop reason: HOSPADM

## 2022-08-11 RX ORDER — DEXAMETHASONE SODIUM PHOSPHATE 4 MG/ML
INJECTION, SOLUTION INTRA-ARTICULAR; INTRALESIONAL; INTRAMUSCULAR; INTRAVENOUS; SOFT TISSUE AS NEEDED
Status: DISCONTINUED | OUTPATIENT
Start: 2022-08-11 | End: 2022-08-11 | Stop reason: SURG

## 2022-08-11 RX ORDER — DIPHENHYDRAMINE HYDROCHLORIDE 50 MG/ML
12.5 INJECTION INTRAMUSCULAR; INTRAVENOUS
Status: DISCONTINUED | OUTPATIENT
Start: 2022-08-11 | End: 2022-08-11 | Stop reason: HOSPADM

## 2022-08-11 RX ORDER — PROPOFOL 10 MG/ML
VIAL (ML) INTRAVENOUS AS NEEDED
Status: DISCONTINUED | OUTPATIENT
Start: 2022-08-11 | End: 2022-08-11 | Stop reason: SURG

## 2022-08-11 RX ORDER — ONDANSETRON 2 MG/ML
INJECTION INTRAMUSCULAR; INTRAVENOUS AS NEEDED
Status: DISCONTINUED | OUTPATIENT
Start: 2022-08-11 | End: 2022-08-11 | Stop reason: SURG

## 2022-08-11 RX ORDER — ONDANSETRON 2 MG/ML
4 INJECTION INTRAMUSCULAR; INTRAVENOUS ONCE AS NEEDED
Status: DISCONTINUED | OUTPATIENT
Start: 2022-08-11 | End: 2022-08-11 | Stop reason: HOSPADM

## 2022-08-11 RX ORDER — EPHEDRINE SULFATE 50 MG/ML
5 INJECTION, SOLUTION INTRAVENOUS ONCE AS NEEDED
Status: DISCONTINUED | OUTPATIENT
Start: 2022-08-11 | End: 2022-08-11 | Stop reason: HOSPADM

## 2022-08-11 RX ORDER — MIDAZOLAM HYDROCHLORIDE 1 MG/ML
INJECTION INTRAMUSCULAR; INTRAVENOUS AS NEEDED
Status: DISCONTINUED | OUTPATIENT
Start: 2022-08-11 | End: 2022-08-11 | Stop reason: SURG

## 2022-08-11 RX ORDER — ROPIVACAINE HYDROCHLORIDE 5 MG/ML
INJECTION, SOLUTION EPIDURAL; INFILTRATION; PERINEURAL
Status: COMPLETED | OUTPATIENT
Start: 2022-08-11 | End: 2022-08-11

## 2022-08-11 RX ORDER — SODIUM CHLORIDE 9 MG/ML
1000 INJECTION, SOLUTION INTRAVENOUS CONTINUOUS
Status: DISCONTINUED | OUTPATIENT
Start: 2022-08-11 | End: 2022-08-11 | Stop reason: HOSPADM

## 2022-08-11 RX ORDER — FLUMAZENIL 0.1 MG/ML
0.2 INJECTION INTRAVENOUS AS NEEDED
Status: DISCONTINUED | OUTPATIENT
Start: 2022-08-11 | End: 2022-08-11 | Stop reason: HOSPADM

## 2022-08-11 RX ORDER — DEXAMETHASONE SODIUM PHOSPHATE 10 MG/ML
INJECTION INTRAMUSCULAR; INTRAVENOUS AS NEEDED
Status: DISCONTINUED | OUTPATIENT
Start: 2022-08-11 | End: 2022-08-11 | Stop reason: HOSPADM

## 2022-08-11 RX ORDER — LIDOCAINE HYDROCHLORIDE 10 MG/ML
INJECTION, SOLUTION EPIDURAL; INFILTRATION; INTRACAUDAL; PERINEURAL
Status: COMPLETED | OUTPATIENT
Start: 2022-08-11 | End: 2022-08-11

## 2022-08-11 RX ORDER — NALOXONE HCL 0.4 MG/ML
0.4 VIAL (ML) INJECTION AS NEEDED
Status: DISCONTINUED | OUTPATIENT
Start: 2022-08-11 | End: 2022-08-11 | Stop reason: HOSPADM

## 2022-08-11 RX ORDER — BUPIVACAINE HCL/0.9 % NACL/PF 0.1 %
2 PLASTIC BAG, INJECTION (ML) EPIDURAL ONCE
Status: COMPLETED | OUTPATIENT
Start: 2022-08-11 | End: 2022-08-11

## 2022-08-11 RX ORDER — ACETAMINOPHEN 325 MG/1
650 TABLET ORAL ONCE AS NEEDED
Status: DISCONTINUED | OUTPATIENT
Start: 2022-08-11 | End: 2022-08-11 | Stop reason: HOSPADM

## 2022-08-11 RX ORDER — BUPIVACAINE HYDROCHLORIDE 2.5 MG/ML
INJECTION, SOLUTION EPIDURAL; INFILTRATION; INTRACAUDAL AS NEEDED
Status: DISCONTINUED | OUTPATIENT
Start: 2022-08-11 | End: 2022-08-11 | Stop reason: HOSPADM

## 2022-08-11 RX ORDER — SCOLOPAMINE TRANSDERMAL SYSTEM 1 MG/1
1 PATCH, EXTENDED RELEASE TRANSDERMAL ONCE
Status: DISCONTINUED | OUTPATIENT
Start: 2022-08-11 | End: 2022-08-11 | Stop reason: HOSPADM

## 2022-08-11 RX ORDER — PROMETHAZINE HYDROCHLORIDE 25 MG/1
25 TABLET ORAL ONCE AS NEEDED
Status: DISCONTINUED | OUTPATIENT
Start: 2022-08-11 | End: 2022-08-11 | Stop reason: HOSPADM

## 2022-08-11 RX ORDER — LIDOCAINE HYDROCHLORIDE 20 MG/ML
INJECTION, SOLUTION INFILTRATION; PERINEURAL AS NEEDED
Status: DISCONTINUED | OUTPATIENT
Start: 2022-08-11 | End: 2022-08-11 | Stop reason: SURG

## 2022-08-11 RX ORDER — BACITRACIN ZINC 500 [USP'U]/G
OINTMENT TOPICAL AS NEEDED
Status: DISCONTINUED | OUTPATIENT
Start: 2022-08-11 | End: 2022-08-11 | Stop reason: HOSPADM

## 2022-08-11 RX ORDER — OXYCODONE AND ACETAMINOPHEN 7.5; 325 MG/1; MG/1
1 TABLET ORAL EVERY 6 HOURS PRN
Qty: 30 TABLET | Refills: 0 | Status: SHIPPED | OUTPATIENT
Start: 2022-08-11 | End: 2022-09-21

## 2022-08-11 RX ORDER — ACETAMINOPHEN 650 MG/1
650 SUPPOSITORY RECTAL ONCE AS NEEDED
Status: DISCONTINUED | OUTPATIENT
Start: 2022-08-11 | End: 2022-08-11 | Stop reason: HOSPADM

## 2022-08-11 RX ADMIN — ROPIVACAINE HYDROCHLORIDE 30 ML: 5 INJECTION, SOLUTION EPIDURAL; INFILTRATION; PERINEURAL at 07:05

## 2022-08-11 RX ADMIN — PROPOFOL 120 MG: 10 INJECTION, EMULSION INTRAVENOUS at 07:12

## 2022-08-11 RX ADMIN — MIDAZOLAM HYDROCHLORIDE 2 MG: 1 INJECTION, SOLUTION INTRAMUSCULAR; INTRAVENOUS at 07:06

## 2022-08-11 RX ADMIN — ONDANSETRON 4 MG: 2 INJECTION INTRAMUSCULAR; INTRAVENOUS at 07:31

## 2022-08-11 RX ADMIN — SODIUM CHLORIDE 1000 ML: 9 INJECTION, SOLUTION INTRAVENOUS at 06:02

## 2022-08-11 RX ADMIN — Medication 2 G: at 07:17

## 2022-08-11 RX ADMIN — LIDOCAINE HYDROCHLORIDE 30 MG: 10 INJECTION, SOLUTION EPIDURAL; INFILTRATION; INTRACAUDAL; PERINEURAL at 07:05

## 2022-08-11 RX ADMIN — FENTANYL CITRATE 50 MCG: 50 INJECTION INTRAMUSCULAR; INTRAVENOUS at 07:08

## 2022-08-11 RX ADMIN — LIDOCAINE HYDROCHLORIDE 50 MG: 20 INJECTION, SOLUTION INFILTRATION; PERINEURAL at 07:12

## 2022-08-11 RX ADMIN — DEXAMETHASONE SODIUM PHOSPHATE 4 MG: 4 INJECTION, SOLUTION INTRAMUSCULAR; INTRAVENOUS at 07:18

## 2022-08-11 NOTE — INTERVAL H&P NOTE
H&P reviewed. The patient was examined and there are no changes to the H&P.              This document has been electronically signed by Floyd Tuttle DPM on August 11, 2022 07:01 CDT

## 2022-08-11 NOTE — BRIEF OP NOTE
FOOT PLANTAR FASCIECTOMY  Progress Note    Matilde Lopez  8/11/2022    Pre-op Diagnosis:   Plantar fasciitis [M72.2]       Post-Op Diagnosis Codes:     * Plantar fasciitis [M72.2]    Procedure/CPT® Codes:      Procedure(s):  PLANTAR FASCIECTOMY LEFT FOOT    Surgeon(s):  Floyd Tuttle DPM    Anesthesia: General with Block    Staff:   Circulator: Aliyah Hamilton RN  Scrub Person: Lashon Alberto  Assistant: Pura Gillette MA  Assistant: Pura Gillette MA      Estimated Blood Loss: minimal    Urine Voided: * No values recorded between 8/11/2022  7:07 AM and 8/11/2022  7:37 AM *    Specimens:                None          Drains: * No LDAs found *    Findings: consistent with pre-op dx        Complications: none     Assistant: Pura Gillette MA  was responsible for performing the following activities: Retraction, Suction and Irrigation and their skilled assistance was necessary for the success of this case.    Floyd Tuttle DPM     Date: 8/11/2022  Time: 07:43 CDT

## 2022-08-11 NOTE — ANESTHESIA PROCEDURE NOTES
Peripheral Block      Patient reassessed immediately prior to procedure    Patient location during procedure: pre-op  Start time: 8/11/2022 6:55 AM  Stop time: 8/11/2022 7:05 AM  Reason for block: procedure for pain, at surgeon's request, post-op pain management and secondary anesthetic  Performed by  Anesthesiologist: Sparkle Lynn DO  Preanesthetic Checklist  Completed: patient identified, IV checked, site marked, risks and benefits discussed, surgical consent, monitors and equipment checked, pre-op evaluation and timeout performed  Prep:  Pt Position: supine  Sterile barriers:cap, gloves and sterile barriers  Prep: ChloraPrep  Patient monitoring: blood pressure monitoring, continuous pulse oximetry and EKG  Procedure    Sedation: no  Performed under: PNB  Guidance:ultrasound guided    ULTRASOUND INTERPRETATION.  Using ultrasound guidance a 21 G gauge needle was placed in close proximity to the sciatic nerve, at which point, under ultrasound guidance anesthetic was injected in the area of the nerve and spread of the anesthesia was seen on ultrasound in close proximity thereto.  There were no abnormalities seen on ultrasound; a digital image was taken; and the patient tolerated the procedure with no complications. Images:still images obtained, printed/placed on chart    Laterality:left  Block Type:popliteal  Injection Technique:single-shot  Needle Type:echogenic  Needle Gauge:20 G  Resistance on Injection: none  Catheter Size:20 G  Cath Depth at skin: 5 cm    Medications Used: lidocaine PF 1% (XYLOCAINE) injection, 30 mg  ropivacaine (NAROPIN) 0.5 % injection, 30 mL      Post Assessment  Injection Assessment: negative aspiration for heme, no paresthesia on injection and incremental injection  Patient Tolerance:comfortable throughout block  Complications:no  Additional Notes  Pt & side identified  U/S used throughout and needle seen throughout  No complications  Pt tolerated procedure well

## 2022-08-11 NOTE — DISCHARGE INSTRUCTIONS
Discharge home   Resume normal diet  Keep dressing c/d/I  Ice and elevate on blue foam elevator  Wbat in cam boot  Percocet 7.5mg prn pain   Follow up in 1 week          This document has been electronically signed by Floyd Tuttle DPM on August 11, 2022 07:45 CDT       What to expect after a Nerve Block    Nerve blocks administered to block pain affect many types of nerves, including those nerves that control movement, pain, and normal sensation. Following a nerve block, you may notice some bruising at the site where the block was given. You may experience sensations such as: numbness of the affected area or limb, tingling, heaviness (that is the limb feels heavy to you), weakness or inability to move the affected arm or leg, or a feeling as if your arm or leg has “fallen asleep.”     A nerve block can last from 2 to 36 hours depending on the medications used.  Usually the weakness wears off first followed by the tingling and heaviness. As the block wears off, you may begin to notice pain; however, this sequence of events may occur in any order. Typically, you will be able to move your limb before you will feel it. Once a nerve block begins to wear off, the effects are usually completely gone within 60 minutes.  If you experience continued side effects that you believe are block related for longer than 48 hours, please call your healthcare provider. Please see block-specific instructions below.    Instructions for any Block involving the leg/foot:   If you have had a leg /foot block, you should not bear weight on the affected leg until the block has worn off. After the block has worn off, weight bearing should be as directed by your surgeon. You may be sent home with crutches. You are at high risk for falling because of the anesthetic effects on your leg. Please use caution when standing or trying to move or walk. Have someone assist you until your leg and foot function have returned to normal.     Protection of  a “blocked” limb  After a nerve block, you cannot feel pain, pressure, or extremes of temperature in the affected limb. And because of this, your blocked limb is at more risk for injury. For example, it is possible to burn your limb on an extremely hot surface without feeling it.     When resting, it is important to reposition your limb periodically to avoid prolonged pressure on it. This may require the use of pillows and padding.    While sleeping, you should avoid rolling onto the affected limb or putting too much pressure on it.     If you have a cast or tight dressing, check the color of your fingers or toes of the affected limb. Call your surgeon if they look discolored (that is, dusky, dark colored).    Use caution in cold weather. Cover your limb appropriately to protect it from the cold.    Pain Management:  Your surgeon will give you a prescription for pain medication. Begin taking this before the nerve block wears off. Bear in mind that sometimes the block can wear off in the middle of the night.

## 2022-08-11 NOTE — ANESTHESIA PREPROCEDURE EVALUATION
Anesthesia Evaluation     Patient summary reviewed and Nursing notes reviewed   history of anesthetic complications (Scopalamine patch ordered pre-op.): PONV  NPO Solid Status: > 8 hours  NPO Liquid Status: > 8 hours           Airway   Mallampati: II  TM distance: >3 FB  Neck ROM: full  No difficulty expected  Dental    (+) poor dentation    Pulmonary - normal exam    breath sounds clear to auscultation  (+) asthma,  (-) COPD, shortness of breath, sleep apnea, rhonchi, decreased breath sounds, wheezes, not a smoker, pulmonary embolism  Cardiovascular - normal exam  Exercise tolerance: good (4-7 METS)    ECG reviewed  Patient on routine beta blocker and Beta blocker given within 24 hours of surgery  Rhythm: regular  Rate: normal    (-) hypertension, past MI, dysrhythmias, angina, murmur, cardiac stents, CABG, DVT    ROS comment: Normal sinus rhythm  Nonspecific ST and T wave abnormality  Abnormal ECG  When compared with ECG of 01-OCT-2017 23:15,  Inverted T waves have replaced nonspecific T wave abnormality in Inferior leads     Referred By:            Confirmed By: ABRAHAM LAU MD    Specimen Collected: 07/12/21 08:30          Neuro/Psych  (+) headaches, psychiatric history Anxiety,    (-) seizures, TIA, CVA  GI/Hepatic/Renal/Endo    (+) obesity,  GERD well controlled, PUD,  diabetes mellitus type 2,   (-) morbid obesity, no thyroid disorder    Musculoskeletal     Abdominal   (+) obese,    Substance History - negative use     OB/GYN negative ob/gyn ROS         Other   arthritis (Gout),      ROS/Med Hx Other: Plantar fascitis    BMI=35.4  S/p hysterectomy    Hx of asthma: Rarely uses albuterol inhaler. Hasn't used or needed it in months.     Hx of GERD controlled on daily pepcid & omeprazole    Pt prescribed propranolol for migraine headaches.     Hx of PONV-received scopolamine patch last time that helped with PONV    Hx of recently diagnosed DM: no HgbA1C on file after 2019.       Phys Exam Other: Successful  airway: I-gel  Size 4   Number of attempts at approach: 1  Assessment: lips, teeth, and gum same as pre-op and atraumatic intubation                  Anesthesia Plan    ASA 3     general with block     (Discussed peripheral nerve block(popliteal) for post op pain relief and patient understands possible complications,risks and agrees.)  intravenous induction     Anesthetic plan, risks, benefits, and alternatives have been provided, discussed and informed consent has been obtained with: patient.    Plan discussed with CRNA.

## 2022-08-11 NOTE — ANESTHESIA POSTPROCEDURE EVALUATION
Patient: Matilde Lopez    Procedure Summary     Date: 08/11/22 Room / Location: [facility] OR 11 / [facility] OR    Anesthesia Start: 0707 Anesthesia Stop: 0752    Procedure: PLANTAR FASCIECTOMY LEFT FOOT (Left Foot) Diagnosis:       Plantar fasciitis      (Plantar fasciitis [M72.2])    Surgeons: Floyd Tuttle DPM Provider: Sparkle Lynn DO    Anesthesia Type: general with block ASA Status: 3          Anesthesia Type: general with block    Vitals  Vitals Value Taken Time   /82 08/11/22 0745   Temp 97.7 °F (36.5 °C) 08/11/22 0745   Pulse 70 08/11/22 0745   Resp 18 08/11/22 0745   SpO2 99 % 08/11/22 0745           Post Anesthesia Care and Evaluation    Patient location during evaluation: PACU  Patient participation: complete - patient cannot participate  Level of consciousness: sleepy but conscious  Pain score: 0  Pain management: adequate    Airway patency: patent  Anesthetic complications: No anesthetic complications  PONV Status: none  Cardiovascular status: acceptable  Respiratory status: acceptable and room air  Hydration status: acceptable  No anesthesia care post op

## 2022-08-11 NOTE — ANESTHESIA PROCEDURE NOTES
Airway  Urgency: elective    Date/Time: 8/11/2022 7:14 AM  Airway not difficult    General Information and Staff    Patient location during procedure: OR  CRNA/CAA: Simone Francis CRNA    Indications and Patient Condition  Indications for airway management: airway protection    Preoxygenated: yes  Mask difficulty assessment: 0 - not attempted    Final Airway Details  Final airway type: supraglottic airway      Successful airway: I-gel  Size 4    Number of attempts at approach: 1  Assessment: lips, teeth, and gum same as pre-op

## 2022-08-12 ENCOUNTER — TELEPHONE (OUTPATIENT)
Dept: PODIATRY | Facility: CLINIC | Age: 37
End: 2022-08-12

## 2022-08-12 RX ORDER — ONDANSETRON 4 MG/1
4 TABLET, ORALLY DISINTEGRATING ORAL EVERY 8 HOURS PRN
Qty: 30 TABLET | Refills: 0 | Status: SHIPPED | OUTPATIENT
Start: 2022-08-12

## 2022-08-12 NOTE — TELEPHONE ENCOUNTER
Talked to Matilde and informed her to cover her blister until we see her on her post operative appointment.

## 2022-08-12 NOTE — OP NOTE
Date of Procedure: 08/11/22     SURGEON: Floyd Tuttle DPM      ASSISTANT: DINESH Maldonado      PREOPERATIVE DIAGNOSIS: Plantar fasciitis,left foot      POSTOPERATIVE DIAGNOSIS: Same as preop     PROCEDURES PERFORMED: Plantar fasciotomy,left foot      ANESTHESIA: LMA       HEMOSTASIS: Pneumatic ankle tourniquet set at 250 mmHg.      ESTIMATED BLOOD LOSS: 20 mL.      SPECIMEN: none     MATERIALS: None     INJECTABLES:  4 mg Decadron with 9 cc of half percent Marcaine plain postoperatively,      COMPLICATIONS: None.      CONDITION: Stable.      PATHOLOGY: None     INDICATIONS FOR PROCEDURE: This is a patient of mine who has chronic recalcitrant plantar fasciitis. She agrees to undergo surgical intervention at this time. Consent is signed and documented in the chart. History and physical exam were reviewed prior to patient being taken to the operating room and n.p.o. status was confirmed. No guarantees were given or implied regarding the outcome of this treatment.      DESCRIPTION OF PROCEDURE: After mild sedation was administered by the anesthesia team in the preoperative holding area the patient was transported to the operating room and placed in a supine position.  General anesthesia was then administered and the right foot was prepped and draped in usual sterile technique and a timeout was performed to confirm the correct patient, correct site, and correct procedure.      Attention was then directed to the left foot which was exsanguinated using Esmarch bandage and the tourniquet was rapidly inflated to 250 mmHg.  Next a linear incision was made just distal to the plantar heel fat pad.  This incision was deepened through subcutaneous tissue taking care to identify and retract all vital neurovascular structures.  The medial and central band of the plantar fascia were directly visualized and were transected utilizing a #15 blade.  The lateral band of the plantar fascial was left intact.  The incision site was  flushed with copious amounts of irrigation.  Layered closure was performed utilizing 4-0 Vicryl and 4-0 nylon.  A sterile dressing was applied.  The tourniquet was deflated and a rapid hyperemic response noted to the distal digits.  The patient tolerated procedure and anesthesia well and was transported from the operating room to the PACU with vital signs stable and neurovascular status intact to the operative extremity.     The plan is to discharge the patient home once stable per anesthesia. She can resume her  Normal diet. she is to be PWB to the left lower extremity in a cam boot. She is to keep his dressing clean, dry and intact.  She will follow up in the podiatry clinic within one week.           This document has been electronically signed by Floyd Tuttle DPM on August 12, 2022 07:39 CDT

## 2022-08-12 NOTE — TELEPHONE ENCOUNTER
PT REQUESTS A CALL BACK LISA BANDAGE ON FT IS TOO TIGHT AND IS CAUSING  HER FT TO HURT MORE.  ALSO SAYS SHE CANT TAKE THE PERCOCET BECAUSE IT IS MAKING HER SICK AND REQUESTS SOMETHING ELSE FOR PAIN.  CALL BACK # [phone].  THANK YOU.

## 2022-08-12 NOTE — TELEPHONE ENCOUNTER
PT REQUESTS A CALL BACK AGAIN RE NOW HAS A BLISTER ON HER HEEL FROM WHERE THE BOOT IS RUBBING HER FT WHEN SHE WALKS AND WANTS TO KNOW WHAT SHE CAN DO ABOUT THIS. TO TREAT IT..  CALL BACK # [phone].  THANK YOU.

## 2022-08-12 NOTE — TELEPHONE ENCOUNTER
Spoke with Patient and per Dr. Tuttle Patient needs to loosen cam boot, called in Zofran, and to continue to take Percocet.

## 2022-08-15 ENCOUNTER — OFFICE VISIT (OUTPATIENT)
Dept: PODIATRY | Facility: CLINIC | Age: 37
End: 2022-08-15

## 2022-08-15 VITALS — WEIGHT: 212 LBS | HEIGHT: 65 IN | BODY MASS INDEX: 35.32 KG/M2

## 2022-08-15 DIAGNOSIS — M72.2 PLANTAR FASCIITIS: Primary | ICD-10-CM

## 2022-08-15 PROCEDURE — 99024 POSTOP FOLLOW-UP VISIT: CPT | Performed by: PODIATRIST

## 2022-08-15 NOTE — PROGRESS NOTES
Matilde Lopez  1985  37 y.o. female   PCP: Rosenda Rocha: 2022  BS - 97 per patient       08/15/2022     Chief Complaint   Patient presents with   • Left Foot - Follow-up       History of Present Illness    Matilde Lopez is a 37 y.o.female who presents to the clinic today for her first post operative appointment. She had plantar fasciectomy on her left foot on 22.    Past Medical History:   Diagnosis Date   • Anemia    • Anxiety    • Asthma    • Diabetes (HCC)    • GERD (gastroesophageal reflux disease)    • Gout    • History of stomach ulcers    • Ingrown toenail    • Migraine    • Plantar fasciitis    • PONV (postoperative nausea and vomiting)          Past Surgical History:   Procedure Laterality Date   •  SECTION     • CHOLECYSTECTOMY     • HERNIA REPAIR     • HYSTERECTOMY     • PLANTAR FASCIA RELEASE Right 7/15/2021    Procedure: PLANTAR FASCIOTOMY FOOT;  Surgeon: Floyd Tuttle DPM;  Location: [facility];  Service: Podiatry;  Laterality: Right;   • SUBTOTAL HYSTERECTOMY           Family History   Problem Relation Age of Onset   • Heart disease Other    • Hypertension Other    • Diabetes Other    • Rashes / Skin problems Mother    • Heart disease Father    • Diabetes Father    • Hypertension Father        Allergies   Allergen Reactions   • Bee Venom Anaphylaxis   • Imitrex [Sumatriptan] Anaphylaxis   • Iodinated Diagnostic Agents Anaphylaxis   • Nsaids Anaphylaxis   • Topiramate Rash       Social History     Socioeconomic History   • Marital status:    Tobacco Use   • Smoking status: Never Smoker   • Smokeless tobacco: Never Used   Vaping Use   • Vaping Use: Never used   Substance and Sexual Activity   • Alcohol use: No   • Drug use: No   • Sexual activity: Defer         Current Outpatient Medications   Medication Sig Dispense Refill   • albuterol sulfate  (90 Base) MCG/ACT inhaler Inhale 2 puffs Every 4 (Four) Hours As Needed for Wheezing.     • amitriptyline (ELAVIL)  10 MG tablet Take 10 mg by mouth Every Night.     • cetirizine (zyrTEC) 10 MG tablet Take 10 mg by mouth Every Night.     • EPINEPHrine (EPIPEN IJ) Inject  as directed As Needed.     • famotidine (PEPCID) 20 MG tablet Take 20 mg by mouth 2 (Two) Times a Day.     • Ferrous Sulfate (IRON PO) Take 325 mg by mouth Daily.     • fluticasone (FLONASE) 50 MCG/ACT nasal spray 2 sprays into the nostril(s) as directed by provider Daily As Needed for Rhinitis.     • GABAPENTIN PO Take 300 mg by mouth Every Night.     • ibuprofen (ADVIL,MOTRIN) 800 MG tablet Take 800 mg by mouth Every 8 (Eight) Hours As Needed for Mild Pain .     • lubiprostone (AMITIZA) 8 MCG capsule Take 8 mcg by mouth 2 (Two) Times a Day With Meals.     • metFORMIN (GLUCOPHAGE) 500 MG tablet Take 500 mg by mouth Daily With Breakfast.     • Multiple Vitamin (MULTIVITAMIN PO) Take 1 tablet by mouth Daily.     • omeprazole (priLOSEC) 40 MG capsule Take 40 mg by mouth 2 (Two) Times a Day.     • ondansetron ODT (Zofran ODT) 4 MG disintegrating tablet Place 1 tablet on the tongue Every 8 (Eight) Hours As Needed for Nausea or Vomiting. 30 tablet 0   • ondansetron ODT (ZOFRAN-ODT) 4 MG disintegrating tablet Place 4 mg on the tongue Every 8 (Eight) Hours As Needed.     • OneTouch Ultra test strip 1 each by Other route Daily.     • oxyCODONE-acetaminophen (Percocet) 7.5-325 MG per tablet Take 1 tablet by mouth Every 6 (Six) Hours As Needed for Moderate Pain. 30 tablet 0   • promethazine (PHENERGAN) 25 MG tablet Take 1 tablet by mouth Every 6 (Six) Hours As Needed for Nausea or Vomiting. 15 tablet 0   • propranolol (INDERAL) 20 MG tablet Take 20 mg by mouth 3 (Three) Times a Day.     • VITAMIN D PO Take 1,000 Units by mouth Daily.       No current facility-administered medications for this visit.       Review of Systems   Constitutional: Negative.    HENT: Negative.    Eyes: Negative.    Respiratory: Negative.    Cardiovascular: Negative.    Gastrointestinal: Negative.   "  Neurological: Negative.    Psychiatric/Behavioral: Negative.          OBJECTIVE    Ht 165.1 cm (65\")   Wt 96.2 kg (212 lb)   LMP  (LMP Unknown)   BMI 35.28 kg/m²       Physical Exam  Vitals reviewed.   Constitutional:       General: She is not in acute distress.     Appearance: She is well-developed.   HENT:      Head: Normocephalic and atraumatic.      Nose: Nose normal.   Eyes:      Conjunctiva/sclera: Conjunctivae normal.      Pupils: Pupils are equal, round, and reactive to light.   Cardiovascular:      Rate and Rhythm: Normal rate.      Pulses: Normal pulses.           Dorsalis pedis pulses are 2+ on the left side.        Posterior tibial pulses are 2+ on the left side.   Pulmonary:      Effort: Pulmonary effort is normal. No respiratory distress.      Breath sounds: No wheezing.   Musculoskeletal:        Feet:    Feet:      Left foot:      Skin integrity: Skin integrity normal.   Neurological:      Mental Status: She is alert and oriented to person, place, and time.   Psychiatric:         Behavior: Behavior normal.         Thought Content: Thought content normal.           Procedures        ASSESSMENT AND PLAN    Diagnoses and all orders for this visit:    1. Plantar fasciitis (Primary)        -Patient doing well postoperatively.  Site care as instructed.  Weightbearing cam boot.  Recheck 1 week, suture removal.          This document has been electronically signed by Floyd Tuttle DPM on August 15, 2022 12:36 CDT       "

## 2022-08-24 ENCOUNTER — OFFICE VISIT (OUTPATIENT)
Dept: PODIATRY | Facility: CLINIC | Age: 37
End: 2022-08-24

## 2022-08-24 VITALS — HEIGHT: 65 IN | BODY MASS INDEX: 35.32 KG/M2 | HEART RATE: 83 BPM | WEIGHT: 212 LBS | OXYGEN SATURATION: 99 %

## 2022-08-24 DIAGNOSIS — M72.2 PLANTAR FASCIITIS: Primary | ICD-10-CM

## 2022-08-24 PROCEDURE — 99024 POSTOP FOLLOW-UP VISIT: CPT | Performed by: PODIATRIST

## 2022-08-24 RX ORDER — AMMONIUM LACTATE 12 G/100G
LOTION TOPICAL
COMMUNITY
Start: 2022-08-23

## 2022-08-24 RX ORDER — AMOXICILLIN AND CLAVULANATE POTASSIUM 875; 125 MG/1; MG/1
TABLET, FILM COATED ORAL
COMMUNITY
Start: 2022-08-23

## 2022-08-24 RX ORDER — CYCLOBENZAPRINE HCL 5 MG
1 TABLET ORAL AS NEEDED
COMMUNITY

## 2022-08-24 RX ORDER — DEXLANSOPRAZOLE 60 MG/1
1 CAPSULE, DELAYED RELEASE ORAL DAILY
COMMUNITY
Start: 2022-06-24

## 2022-09-07 ENCOUNTER — OFFICE VISIT (OUTPATIENT)
Dept: PODIATRY | Facility: CLINIC | Age: 37
End: 2022-09-07

## 2022-09-07 VITALS — BODY MASS INDEX: 35.32 KG/M2 | HEIGHT: 65 IN | HEART RATE: 76 BPM | WEIGHT: 212 LBS | OXYGEN SATURATION: 98 %

## 2022-09-07 DIAGNOSIS — M72.2 PLANTAR FASCIITIS: ICD-10-CM

## 2022-09-07 DIAGNOSIS — S99.921A INJURY OF RIGHT FOOT, INITIAL ENCOUNTER: Primary | ICD-10-CM

## 2022-09-07 PROCEDURE — 99213 OFFICE O/P EST LOW 20 MIN: CPT | Performed by: PODIATRIST

## 2022-09-07 PROCEDURE — 99024 POSTOP FOLLOW-UP VISIT: CPT | Performed by: PODIATRIST

## 2022-09-07 RX ORDER — GABAPENTIN 300 MG/1
CAPSULE ORAL
COMMUNITY
Start: 2022-09-02

## 2022-09-07 NOTE — PROGRESS NOTES
Matilde Lopez  1985  37 y.o. female   PCP: Rosenda Rocha: 2022  BS - 97 per patient       2022     Chief Complaint   Patient presents with   • Left Foot - Post-op              History of Present Illness    Matilde Lopez is a 37 y.o.female who presents to the clinic today for her  post operative appointment. She had plantar fasciectomy on her left foot on 22.  Patient states the left foot is doing very well.  However, she does relate to new right foot injury.  Date of injury 2022.  She is currently ambulating in the cam boot.    Past Medical History:   Diagnosis Date   • Anemia    • Anxiety    • Asthma    • Diabetes (HCC)    • GERD (gastroesophageal reflux disease)    • Gout    • History of stomach ulcers    • Ingrown toenail    • Migraine    • Plantar fasciitis    • PONV (postoperative nausea and vomiting)          Past Surgical History:   Procedure Laterality Date   •  SECTION     • CHOLECYSTECTOMY     • HERNIA REPAIR     • HYSTERECTOMY     • PLANTAR FASCIA RELEASE Right 7/15/2021    Procedure: PLANTAR FASCIOTOMY FOOT;  Surgeon: Floyd Tuttle DPM;  Location: [facility];  Service: Podiatry;  Laterality: Right;   • PLANTAR FASCIA RELEASE Left 2022    Procedure: PLANTAR FASCIECTOMY LEFT FOOT;  Surgeon: Floyd Tuttle DPM;  Location: [facility];  Service: Podiatry;  Laterality: Left;   • SUBTOTAL HYSTERECTOMY           Family History   Problem Relation Age of Onset   • Heart disease Other    • Hypertension Other    • Diabetes Other    • Rashes / Skin problems Mother    • Heart disease Father    • Diabetes Father    • Hypertension Father        Allergies   Allergen Reactions   • Bee Venom Anaphylaxis   • Imitrex [Sumatriptan] Anaphylaxis   • Iodinated Diagnostic Agents Anaphylaxis   • Nsaids Anaphylaxis   • Topiramate Rash       Social History     Socioeconomic History   • Marital status:    Tobacco Use   • Smoking status: Never Smoker   • Smokeless tobacco:  Never Used   Vaping Use   • Vaping Use: Never used   Substance and Sexual Activity   • Alcohol use: No   • Drug use: No   • Sexual activity: Defer         Current Outpatient Medications   Medication Sig Dispense Refill   • albuterol sulfate  (90 Base) MCG/ACT inhaler Inhale 2 puffs Every 4 (Four) Hours As Needed for Wheezing.     • amitriptyline (ELAVIL) 10 MG tablet Take 10 mg by mouth Every Night.     • ammonium lactate (LAC-HYDRIN) 12 % lotion      • amoxicillin-clavulanate (AUGMENTIN) 875-125 MG per tablet      • cetirizine (zyrTEC) 10 MG tablet Take 10 mg by mouth Every Night.     • cyclobenzaprine (FLEXERIL) 5 MG tablet Take 1 tablet by mouth As Needed.     • Dexilant 60 MG capsule Take 1 capsule by mouth Daily.     • EPINEPHrine (EPIPEN IJ) Inject  as directed As Needed.     • famotidine (PEPCID) 20 MG tablet Take 20 mg by mouth 2 (Two) Times a Day.     • Ferrous Sulfate (IRON PO) Take 325 mg by mouth Daily.     • fluticasone (FLONASE) 50 MCG/ACT nasal spray 2 sprays into the nostril(s) as directed by provider Daily As Needed for Rhinitis.     • gabapentin (NEURONTIN) 300 MG capsule      • GABAPENTIN PO Take 300 mg by mouth Every Night.     • ibuprofen (ADVIL,MOTRIN) 800 MG tablet Take 800 mg by mouth Every 8 (Eight) Hours As Needed for Mild Pain .     • lubiprostone (AMITIZA) 8 MCG capsule Take 8 mcg by mouth 2 (Two) Times a Day With Meals.     • metFORMIN (GLUCOPHAGE) 500 MG tablet Take 500 mg by mouth Daily With Breakfast.     • Multiple Vitamin (MULTIVITAMIN PO) Take 1 tablet by mouth Daily.     • omeprazole (priLOSEC) 40 MG capsule Take 40 mg by mouth 2 (Two) Times a Day.     • ondansetron ODT (Zofran ODT) 4 MG disintegrating tablet Place 1 tablet on the tongue Every 8 (Eight) Hours As Needed for Nausea or Vomiting. 30 tablet 0   • ondansetron ODT (ZOFRAN-ODT) 4 MG disintegrating tablet Place 4 mg on the tongue Every 8 (Eight) Hours As Needed.     • OneTouch Ultra test strip 1 each by Other route  "Daily.     • oxyCODONE-acetaminophen (Percocet) 7.5-325 MG per tablet Take 1 tablet by mouth Every 6 (Six) Hours As Needed for Moderate Pain. 30 tablet 0   • promethazine (PHENERGAN) 25 MG tablet Take 1 tablet by mouth Every 6 (Six) Hours As Needed for Nausea or Vomiting. 15 tablet 0   • propranolol (INDERAL) 20 MG tablet Take 20 mg by mouth 3 (Three) Times a Day.     • VITAMIN D PO Take 1,000 Units by mouth Daily.       No current facility-administered medications for this visit.       Review of Systems   Constitutional: Negative.    HENT: Negative.    Eyes: Negative.    Respiratory: Negative.    Cardiovascular: Negative.    Gastrointestinal: Negative.    Musculoskeletal:        Right foot pain and swelling   Skin: Negative.    Neurological: Negative.    Psychiatric/Behavioral: Negative.          OBJECTIVE    Pulse 76   Ht 165.1 cm (65\")   Wt 96.2 kg (212 lb)   LMP  (LMP Unknown)   SpO2 98%   BMI 35.28 kg/m²       Physical Exam  Vitals reviewed.   Constitutional:       General: She is not in acute distress.     Appearance: She is well-developed.   HENT:      Head: Normocephalic and atraumatic.      Nose: Nose normal.   Eyes:      Conjunctiva/sclera: Conjunctivae normal.      Pupils: Pupils are equal, round, and reactive to light.   Cardiovascular:      Rate and Rhythm: Normal rate.      Pulses: Normal pulses.           Dorsalis pedis pulses are 2+ on the right side and 2+ on the left side.        Posterior tibial pulses are 2+ on the right side and 2+ on the left side.   Pulmonary:      Effort: Pulmonary effort is normal. No respiratory distress.      Breath sounds: No wheezing.   Musculoskeletal:      Right foot: Decreased range of motion. No deformity or bunion.        Feet:    Feet:      Right foot:      Skin integrity: Skin integrity normal.      Left foot:      Skin integrity: Skin integrity normal.   Neurological:      Mental Status: She is alert and oriented to person, place, and time.   Psychiatric:    "      Behavior: Behavior normal.         Thought Content: Thought content normal.           Procedures        ASSESSMENT AND PLAN    Diagnoses and all orders for this visit:    1. Injury of right foot, initial encounter (Primary)    2. Plantar fasciitis        -Patient doing well postoperatively.  Okay to transition to athletic shoe gear.  -Reports for the imaging for right foot reviewed.  No obvious osseous pathology.  Slight concern for ligamentous Lisfranc injury.  Continue cam boot.  Ice and anti-inflammatories.  Activity modification.    -All questions answered  -Recheck 2 weeks          This document has been electronically signed by Floyd Tuttle DPM on September 9, 2022 07:33 CDT

## 2022-09-21 ENCOUNTER — OFFICE VISIT (OUTPATIENT)
Dept: PODIATRY | Facility: CLINIC | Age: 37
End: 2022-09-21

## 2022-09-21 VITALS — OXYGEN SATURATION: 98 % | HEIGHT: 65 IN | BODY MASS INDEX: 35.32 KG/M2 | WEIGHT: 212 LBS | HEART RATE: 68 BPM

## 2022-09-21 DIAGNOSIS — M72.2 PLANTAR FASCIITIS: ICD-10-CM

## 2022-09-21 DIAGNOSIS — S99.921A INJURY OF RIGHT FOOT, INITIAL ENCOUNTER: Primary | ICD-10-CM

## 2022-09-21 PROCEDURE — 99213 OFFICE O/P EST LOW 20 MIN: CPT | Performed by: PODIATRIST

## 2022-09-21 NOTE — PROGRESS NOTES
Matilde Lopez  1985  37 y.o. female   PCP: Rosenda Rocha: 2022  BS - 97 per patient       2022     Chief Complaint   Patient presents with   • Right Foot - Follow-up   • Left Foot - Follow-up       History of Present Illness    Matilde Lopez is a 37 y.o.female who presents to the clinic today for her post operative appointment. She had plantar fasciectomy on her left foot on 22.  Also pain in the right foot. Date of injury on right foot was 2022. States pain is a 6.     Past Medical History:   Diagnosis Date   • Anemia    • Anxiety    • Asthma    • Diabetes (HCC)    • GERD (gastroesophageal reflux disease)    • Gout    • History of stomach ulcers    • Ingrown toenail    • Migraine    • Plantar fasciitis    • PONV (postoperative nausea and vomiting)          Past Surgical History:   Procedure Laterality Date   •  SECTION     • CHOLECYSTECTOMY     • HERNIA REPAIR     • HYSTERECTOMY     • PLANTAR FASCIA RELEASE Right 7/15/2021    Procedure: PLANTAR FASCIOTOMY FOOT;  Surgeon: Floyd Tuttle DPM;  Location: [facility];  Service: Podiatry;  Laterality: Right;   • PLANTAR FASCIA RELEASE Left 2022    Procedure: PLANTAR FASCIECTOMY LEFT FOOT;  Surgeon: Floyd Tuttle DPM;  Location: [facility];  Service: Podiatry;  Laterality: Left;   • SUBTOTAL HYSTERECTOMY           Family History   Problem Relation Age of Onset   • Heart disease Other    • Hypertension Other    • Diabetes Other    • Rashes / Skin problems Mother    • Heart disease Father    • Diabetes Father    • Hypertension Father        Allergies   Allergen Reactions   • Bee Venom Anaphylaxis   • Imitrex [Sumatriptan] Anaphylaxis   • Iodinated Diagnostic Agents Anaphylaxis   • Nsaids Anaphylaxis   • Topiramate Rash       Social History     Socioeconomic History   • Marital status:    Tobacco Use   • Smoking status: Never Smoker   • Smokeless tobacco: Never Used   Vaping Use   • Vaping Use: Never used   Substance  and Sexual Activity   • Alcohol use: No   • Drug use: No   • Sexual activity: Defer         Current Outpatient Medications   Medication Sig Dispense Refill   • albuterol sulfate  (90 Base) MCG/ACT inhaler Inhale 2 puffs Every 4 (Four) Hours As Needed for Wheezing.     • amitriptyline (ELAVIL) 10 MG tablet Take 10 mg by mouth Every Night.     • ammonium lactate (LAC-HYDRIN) 12 % lotion      • amoxicillin-clavulanate (AUGMENTIN) 875-125 MG per tablet      • cetirizine (zyrTEC) 10 MG tablet Take 10 mg by mouth Every Night.     • cyclobenzaprine (FLEXERIL) 5 MG tablet Take 1 tablet by mouth As Needed.     • Dexilant 60 MG capsule Take 1 capsule by mouth Daily.     • EPINEPHrine (EPIPEN IJ) Inject  as directed As Needed.     • famotidine (PEPCID) 20 MG tablet Take 20 mg by mouth 2 (Two) Times a Day.     • Ferrous Sulfate (IRON PO) Take 325 mg by mouth Daily.     • fluticasone (FLONASE) 50 MCG/ACT nasal spray 2 sprays into the nostril(s) as directed by provider Daily As Needed for Rhinitis.     • gabapentin (NEURONTIN) 300 MG capsule      • GABAPENTIN PO Take 300 mg by mouth Every Night.     • ibuprofen (ADVIL,MOTRIN) 800 MG tablet Take 800 mg by mouth Every 8 (Eight) Hours As Needed for Mild Pain .     • lubiprostone (AMITIZA) 8 MCG capsule Take 8 mcg by mouth 2 (Two) Times a Day With Meals.     • metFORMIN (GLUCOPHAGE) 500 MG tablet Take 500 mg by mouth Daily With Breakfast.     • Multiple Vitamin (MULTIVITAMIN PO) Take 1 tablet by mouth Daily.     • omeprazole (priLOSEC) 40 MG capsule Take 40 mg by mouth 2 (Two) Times a Day.     • ondansetron ODT (Zofran ODT) 4 MG disintegrating tablet Place 1 tablet on the tongue Every 8 (Eight) Hours As Needed for Nausea or Vomiting. 30 tablet 0   • OneTouch Ultra test strip 1 each by Other route Daily.     • promethazine (PHENERGAN) 25 MG tablet Take 1 tablet by mouth Every 6 (Six) Hours As Needed for Nausea or Vomiting. 15 tablet 0   • propranolol (INDERAL) 20 MG tablet Take  "20 mg by mouth 3 (Three) Times a Day.     • VITAMIN D PO Take 1,000 Units by mouth Daily.       No current facility-administered medications for this visit.       Review of Systems   Constitutional: Negative.    HENT: Negative.    Eyes: Negative.    Respiratory: Negative.    Cardiovascular: Negative.    Gastrointestinal: Negative.    Musculoskeletal:        Right foot pain and swelling   Skin: Negative.    Neurological: Negative.    Psychiatric/Behavioral: Negative.          OBJECTIVE    Pulse 68   Ht 165.1 cm (65\")   Wt 96.2 kg (212 lb)   LMP  (LMP Unknown)   SpO2 98%   BMI 35.28 kg/m²       Physical Exam  Vitals reviewed.   Constitutional:       General: She is not in acute distress.     Appearance: She is well-developed.   HENT:      Head: Normocephalic and atraumatic.      Nose: Nose normal.   Eyes:      Conjunctiva/sclera: Conjunctivae normal.      Pupils: Pupils are equal, round, and reactive to light.   Cardiovascular:      Rate and Rhythm: Normal rate.      Pulses: Normal pulses.           Dorsalis pedis pulses are 2+ on the right side and 2+ on the left side.        Posterior tibial pulses are 2+ on the right side and 2+ on the left side.   Pulmonary:      Effort: Pulmonary effort is normal. No respiratory distress.      Breath sounds: No wheezing.   Musculoskeletal:      Right foot: Decreased range of motion. No deformity or bunion.        Feet:    Feet:      Right foot:      Skin integrity: Skin integrity normal.      Left foot:      Skin integrity: Skin integrity normal.   Neurological:      Mental Status: She is alert and oriented to person, place, and time.   Psychiatric:         Behavior: Behavior normal.         Thought Content: Thought content normal.           Procedures        ASSESSMENT AND PLAN    Diagnoses and all orders for this visit:    1. Injury of right foot, initial encounter (Primary)  -     Cancel: XR Foot 3+ View Left  -     XR foot 3+ vw right    2. Plantar " fasciitis        -Patient doing well postoperatively.  Recommend massaging scar to plantar right foot.  -Repeat imaging obtained of right foot.  No acute pathology.  Continue ice and anti-inflammatories.  -All questions answered  -Recheck 4 weeks          This document has been electronically signed by Floyd Tuttle DPM on September 23, 2022 08:22 CDT

## 2022-11-13 LAB
QT INTERVAL: 426 MS
QTC INTERVAL: 421 MS

## 2022-12-21 ENCOUNTER — OFFICE VISIT (OUTPATIENT)
Dept: PODIATRY | Facility: CLINIC | Age: 37
End: 2022-12-21

## 2022-12-21 VITALS
WEIGHT: 212 LBS | OXYGEN SATURATION: 99 % | SYSTOLIC BLOOD PRESSURE: 124 MMHG | HEART RATE: 86 BPM | DIASTOLIC BLOOD PRESSURE: 84 MMHG | HEIGHT: 65 IN | BODY MASS INDEX: 35.32 KG/M2

## 2022-12-21 DIAGNOSIS — S93.401D SPRAIN OF RIGHT ANKLE, UNSPECIFIED LIGAMENT, SUBSEQUENT ENCOUNTER: ICD-10-CM

## 2022-12-21 DIAGNOSIS — M25.571 RIGHT ANKLE PAIN, UNSPECIFIED CHRONICITY: Primary | ICD-10-CM

## 2022-12-21 PROCEDURE — 99213 OFFICE O/P EST LOW 20 MIN: CPT | Performed by: PODIATRIST

## 2022-12-21 NOTE — PROGRESS NOTES
Matilde Lopez  1985  37 y.o. female   PCP: Yaneth Tipton 2022  BS - 97 per patient       2022     Chief Complaint   Patient presents with   • Right Foot - Follow-up     Injury 2022       History of Present Illness    Matilde oLpez is a 37 y.o.female who presents to the clinic today for a recheck of the right foot/ankle injury that occurred on 22.  Continues to complain of pain to the outside of her ankle.    Past Medical History:   Diagnosis Date   • Anemia    • Anxiety    • Asthma    • Diabetes (HCC)    • GERD (gastroesophageal reflux disease)    • Gout    • History of stomach ulcers    • Ingrown toenail    • Migraine    • Plantar fasciitis    • PONV (postoperative nausea and vomiting)          Past Surgical History:   Procedure Laterality Date   •  SECTION     • CHOLECYSTECTOMY     • HERNIA REPAIR     • HYSTERECTOMY     • PLANTAR FASCIA RELEASE Right 7/15/2021    Procedure: PLANTAR FASCIOTOMY FOOT;  Surgeon: Floyd Tuttle DPM;  Location: [facility];  Service: Podiatry;  Laterality: Right;   • PLANTAR FASCIA RELEASE Left 2022    Procedure: PLANTAR FASCIECTOMY LEFT FOOT;  Surgeon: Floyd Tuttle DPM;  Location: [facility];  Service: Podiatry;  Laterality: Left;   • SUBTOTAL HYSTERECTOMY           Family History   Problem Relation Age of Onset   • Heart disease Other    • Hypertension Other    • Diabetes Other    • Rashes / Skin problems Mother    • Heart disease Father    • Diabetes Father    • Hypertension Father        Allergies   Allergen Reactions   • Bee Venom Anaphylaxis   • Imitrex [Sumatriptan] Anaphylaxis   • Iodinated Diagnostic Agents Anaphylaxis   • Nsaids Anaphylaxis   • Topiramate Rash       Social History     Socioeconomic History   • Marital status:    Tobacco Use   • Smoking status: Never   • Smokeless tobacco: Never   Vaping Use   • Vaping Use: Never used   Substance and Sexual Activity   • Alcohol use: No   • Drug use: No   • Sexual  activity: Defer         Current Outpatient Medications   Medication Sig Dispense Refill   • albuterol sulfate  (90 Base) MCG/ACT inhaler Inhale 2 puffs Every 4 (Four) Hours As Needed for Wheezing.     • amitriptyline (ELAVIL) 10 MG tablet Take 10 mg by mouth Every Night.     • ammonium lactate (LAC-HYDRIN) 12 % lotion      • amoxicillin-clavulanate (AUGMENTIN) 875-125 MG per tablet      • cetirizine (zyrTEC) 10 MG tablet Take 10 mg by mouth Every Night.     • cyclobenzaprine (FLEXERIL) 5 MG tablet Take 1 tablet by mouth As Needed.     • Dexilant 60 MG capsule Take 1 capsule by mouth Daily.     • EPINEPHrine (EPIPEN IJ) Inject  as directed As Needed.     • famotidine (PEPCID) 20 MG tablet Take 20 mg by mouth 2 (Two) Times a Day.     • Ferrous Sulfate (IRON PO) Take 325 mg by mouth Daily.     • fluticasone (FLONASE) 50 MCG/ACT nasal spray 2 sprays into the nostril(s) as directed by provider Daily As Needed for Rhinitis.     • gabapentin (NEURONTIN) 300 MG capsule      • GABAPENTIN PO Take 300 mg by mouth Every Night.     • ibuprofen (ADVIL,MOTRIN) 800 MG tablet Take 800 mg by mouth Every 8 (Eight) Hours As Needed for Mild Pain .     • lubiprostone (AMITIZA) 8 MCG capsule Take 8 mcg by mouth 2 (Two) Times a Day With Meals.     • metFORMIN (GLUCOPHAGE) 500 MG tablet Take 500 mg by mouth Daily With Breakfast.     • Multiple Vitamin (MULTIVITAMIN PO) Take 1 tablet by mouth Daily.     • omeprazole (priLOSEC) 40 MG capsule Take 40 mg by mouth 2 (Two) Times a Day.     • ondansetron ODT (Zofran ODT) 4 MG disintegrating tablet Place 1 tablet on the tongue Every 8 (Eight) Hours As Needed for Nausea or Vomiting. 30 tablet 0   • OneTouch Ultra test strip 1 each by Other route Daily.     • promethazine (PHENERGAN) 25 MG tablet Take 1 tablet by mouth Every 6 (Six) Hours As Needed for Nausea or Vomiting. 15 tablet 0   • propranolol (INDERAL) 20 MG tablet Take 20 mg by mouth 3 (Three) Times a Day.     • VITAMIN D PO Take 1,000  "Units by mouth Daily.     • diclofenac (VOLTAREN) 50 MG EC tablet Take 1 tablet by mouth 2 (Two) Times a Day. 60 tablet 1     No current facility-administered medications for this visit.       Review of Systems   Constitutional: Negative.    HENT: Negative.    Eyes: Negative.    Respiratory: Negative.    Cardiovascular: Negative.    Gastrointestinal: Negative.    Musculoskeletal:        Right foot pain and swelling   Skin: Negative.    Neurological: Negative.    Psychiatric/Behavioral: Negative.          OBJECTIVE    /84 (BP Location: Left arm, Patient Position: Sitting, Cuff Size: Adult)   Pulse 86   Ht 165.1 cm (65\")   Wt 96.2 kg (212 lb)   LMP  (LMP Unknown)   SpO2 99%   BMI 35.28 kg/m²       Physical Exam  Vitals reviewed.   Constitutional:       General: She is not in acute distress.     Appearance: She is well-developed.   HENT:      Head: Normocephalic and atraumatic.      Nose: Nose normal.   Eyes:      Conjunctiva/sclera: Conjunctivae normal.      Pupils: Pupils are equal, round, and reactive to light.   Cardiovascular:      Rate and Rhythm: Normal rate.      Pulses: Normal pulses.           Dorsalis pedis pulses are 2+ on the right side and 2+ on the left side.        Posterior tibial pulses are 2+ on the right side and 2+ on the left side.   Pulmonary:      Effort: Pulmonary effort is normal. No respiratory distress.      Breath sounds: No wheezing.   Musculoskeletal:      Right foot: Decreased range of motion. Tenderness present. No deformity or bunion.        Legs:    Feet:      Right foot:      Skin integrity: Skin integrity normal.      Left foot:      Skin integrity: Skin integrity normal.   Neurological:      Mental Status: She is alert and oriented to person, place, and time.   Psychiatric:         Behavior: Behavior normal.         Thought Content: Thought content normal.           Procedures        ASSESSMENT AND PLAN    Diagnoses and all orders for this visit:    1. Right ankle pain, " unspecified chronicity (Primary)  -     XR Ankle 3+ View Right  -     MRI Ankle Right Without Contrast; Future    2. Sprain of right ankle, unspecified ligament, subsequent encounter    Other orders  -     diclofenac (VOLTAREN) 50 MG EC tablet; Take 1 tablet by mouth 2 (Two) Times a Day.  Dispense: 60 tablet; Refill: 1        -Patient with continued pain to the right lateral ankle.  Radiographs taken reviewed with no acute pathology.  MRI ordered to further evaluate.  -Rx diclofenac  -All questions answered  -Recheck after MRI          This document has been electronically signed by Floyd Tuttle DPM on December 23, 2022 09:11 CST

## 2023-07-31 ENCOUNTER — OFFICE VISIT (OUTPATIENT)
Dept: PODIATRY | Facility: CLINIC | Age: 38
End: 2023-07-31
Payer: COMMERCIAL

## 2023-07-31 VITALS
HEART RATE: 70 BPM | DIASTOLIC BLOOD PRESSURE: 80 MMHG | OXYGEN SATURATION: 98 % | HEIGHT: 65 IN | BODY MASS INDEX: 35.32 KG/M2 | WEIGHT: 212 LBS | SYSTOLIC BLOOD PRESSURE: 122 MMHG

## 2023-07-31 DIAGNOSIS — L60.0 INGROWN TOENAIL: Primary | ICD-10-CM

## 2023-07-31 PROCEDURE — 99213 OFFICE O/P EST LOW 20 MIN: CPT | Performed by: NURSE PRACTITIONER

## 2023-07-31 RX ORDER — SULFAMETHOXAZOLE AND TRIMETHOPRIM 800; 160 MG/1; MG/1
1 TABLET ORAL 2 TIMES DAILY
Qty: 20 TABLET | Refills: 0 | Status: SHIPPED | OUTPATIENT
Start: 2023-07-31

## (undated) DEVICE — SOL IRR NACL 0.9PCT BT 1000ML

## (undated) DEVICE — GOWN,PREVENTION PLUS,XLNG/XXLARGE,STRL: Brand: MEDLINE

## (undated) DEVICE — DRSNG WND GZ CURAD OIL EMULSION 3X3IN STRL

## (undated) DEVICE — PATIENT RETURN ELECTRODE, SINGLE-USE, CONTACT QUALITY MONITORING, ADULT, WITH 9FT CORD, FOR PATIENTS WEIGING OVER 33LBS. (15KG): Brand: MEGADYNE

## (undated) DEVICE — PK POD 60

## (undated) DEVICE — SPNG GZ WOVN 8PLY 4X4IN LF STRL PK/2

## (undated) DEVICE — GLV SURG SENSICARE PI ORTHO SZ6.5 LF STRL

## (undated) DEVICE — COVER,MAYO STAND,STERILE: Brand: MEDLINE

## (undated) DEVICE — BLD BEAVR MINI RND/TIP GRN

## (undated) DEVICE — SUT ETHLN 4/0 PS2 18IN 1667H

## (undated) DEVICE — GLV SURG SENSICARE PI ORTHO SZ7.5 LF STRL

## (undated) DEVICE — DISPOSABLE TOURNIQUET CUFF SINGLE BLADDER, DUAL PORT AND QUICK CONNECT CONNECTOR: Brand: COLOR CUFF

## (undated) DEVICE — CUFF TOURNI 1BLADDER 1PRT 18IN STRL

## (undated) DEVICE — STERILE POLYISOPRENE POWDER-FREE SURGICAL GLOVES WITH EMOLLIENT COATING: Brand: PROTEXIS

## (undated) DEVICE — SPNG GZ WOVN 4X4IN 12PLY 10/BX STRL

## (undated) DEVICE — DRSNG SURESITE WNDW 4X4.5

## (undated) DEVICE — SUT MONOCRYL 4/0 PS2 27IN Y426H ETY426H

## (undated) DEVICE — NDL HYPO ECLPS SFTY 25G 1 1/2IN

## (undated) DEVICE — APPL CHLORAPREP HI/LITE 26ML ORNG

## (undated) DEVICE — TBG PENCL TELESCP MEGADYNE SMOKE EVAC 10FT

## (undated) DEVICE — PENCL ES MEGADINE EZ/CLEAN BUTN W/HOLSTR 10FT

## (undated) DEVICE — SYRINGE, LUER LOCK, 10ML: Brand: MEDLINE

## (undated) DEVICE — GLV SURG SIGNATURE ESSENTIAL PF LTX SZ7

## (undated) DEVICE — SYR LUERLOK 20CC BX/50